# Patient Record
Sex: MALE | Race: WHITE | NOT HISPANIC OR LATINO | Employment: FULL TIME | URBAN - METROPOLITAN AREA
[De-identification: names, ages, dates, MRNs, and addresses within clinical notes are randomized per-mention and may not be internally consistent; named-entity substitution may affect disease eponyms.]

---

## 2017-02-24 ENCOUNTER — CONVERSION ENCOUNTER (OUTPATIENT)
Dept: FAMILY MEDICINE CLINIC | Facility: CLINIC | Age: 37
End: 2017-02-24

## 2019-02-01 ENCOUNTER — HOSPITAL ENCOUNTER (OUTPATIENT)
Dept: FAMILY MEDICINE CLINIC | Facility: CLINIC | Age: 39
Setting detail: SPECIMEN
Discharge: HOME | End: 2019-02-01
Attending: FAMILY MEDICINE | Admitting: FAMILY MEDICINE

## 2019-02-01 ENCOUNTER — CONVERSION ENCOUNTER (OUTPATIENT)
Dept: FAMILY MEDICINE CLINIC | Facility: CLINIC | Age: 39
End: 2019-02-01

## 2019-02-01 LAB
ALBUMIN SERPL-MCNC: 4.1 G/DL (ref 3.5–4.8)
ALBUMIN/GLOB SERPL: 1.6 {RATIO} (ref 1–1.7)
ALP SERPL-CCNC: 45 IU/L (ref 32–91)
ALT SERPL-CCNC: 36 IU/L (ref 17–63)
ANION GAP SERPL CALC-SCNC: 12.7 MMOL/L (ref 10–20)
AST SERPL-CCNC: 37 IU/L (ref 15–41)
BASOPHILS # BLD AUTO: 0.1 10*3/UL (ref 0–0.2)
BASOPHILS NFR BLD AUTO: 1 % (ref 0–2)
BILIRUB SERPL-MCNC: 0.6 MG/DL (ref 0.3–1.2)
BILIRUB UR QL STRIP: NEGATIVE MG/DL
BUN SERPL-MCNC: 14 MG/DL (ref 8–20)
BUN/CREAT SERPL: 14 (ref 6.2–20.3)
CALCIUM SERPL-MCNC: 9 MG/DL (ref 8.9–10.3)
CASTS URNS QL MICRO: ABNORMAL /[LPF]
CHLORIDE SERPL-SCNC: 100 MMOL/L (ref 101–111)
CHOLEST SERPL-MCNC: 174 MG/DL
CHOLEST/HDLC SERPL: 3.9 {RATIO}
COLOR UR: YELLOW
CONV BACTERIA IN URINE MICRO: NEGATIVE
CONV CLARITY OF URINE: CLEAR
CONV CO2: 26 MMOL/L (ref 22–32)
CONV HYALINE CASTS IN URINE MICRO: 0 /[LPF] (ref 0–5)
CONV LDL CHOLESTEROL DIRECT: 115 MG/DL (ref 0–100)
CONV PROTEIN IN URINE BY AUTOMATED TEST STRIP: NEGATIVE MG/DL
CONV SMALL ROUND CELLS: ABNORMAL /[HPF]
CONV TOTAL PROTEIN: 6.7 G/DL (ref 6.1–7.9)
CONV UROBILINOGEN IN URINE BY AUTOMATED TEST STRIP: 0.2 MG/DL
CREAT UR-MCNC: 1 MG/DL (ref 0.7–1.2)
CULTURE INDICATED?: ABNORMAL
DIFFERENTIAL METHOD BLD: (no result)
EOSINOPHIL # BLD AUTO: 0.3 10*3/UL (ref 0–0.3)
EOSINOPHIL # BLD AUTO: 5 % (ref 0–3)
ERYTHROCYTE [DISTWIDTH] IN BLOOD BY AUTOMATED COUNT: 13 % (ref 11.5–14.5)
GLOBULIN UR ELPH-MCNC: 2.6 G/DL (ref 2.5–3.8)
GLUCOSE SERPL-MCNC: 90 MG/DL (ref 65–99)
GLUCOSE UR QL: NEGATIVE MG/DL
HCT VFR BLD AUTO: 40.1 % (ref 40–54)
HDLC SERPL-MCNC: 45 MG/DL
HGB BLD-MCNC: 13.6 G/DL (ref 14–18)
HGB UR QL STRIP: NEGATIVE
KETONES UR QL STRIP: ABNORMAL MG/DL
LDLC/HDLC SERPL: 2.6 {RATIO}
LEUKOCYTE ESTERASE UR QL STRIP: NEGATIVE
LIPID INTERPRETATION: ABNORMAL
LYMPHOCYTES # BLD AUTO: 2 10*3/UL (ref 0.8–4.8)
LYMPHOCYTES NFR BLD AUTO: 33 % (ref 18–42)
MCH RBC QN AUTO: 30.4 PG (ref 26–32)
MCHC RBC AUTO-ENTMCNC: 33.9 G/DL (ref 32–36)
MCV RBC AUTO: 89.6 FL (ref 80–94)
MONOCYTES # BLD AUTO: 0.4 10*3/UL (ref 0.1–1.3)
MONOCYTES NFR BLD AUTO: 6 % (ref 2–11)
NEUTROPHILS # BLD AUTO: 3.3 10*3/UL (ref 2.3–8.6)
NEUTROPHILS NFR BLD AUTO: 55 % (ref 50–75)
NITRITE UR QL STRIP: NEGATIVE
NRBC BLD AUTO-RTO: 0 /100{WBCS}
NRBC/RBC NFR BLD MANUAL: 0 10*3/UL
PH UR STRIP.AUTO: 6 [PH] (ref 4.5–8)
PLATELET # BLD AUTO: 280 10*3/UL (ref 150–450)
PMV BLD AUTO: 8 FL (ref 7.4–10.4)
POTASSIUM SERPL-SCNC: 3.7 MMOL/L (ref 3.6–5.1)
RBC # BLD AUTO: 4.47 10*6/UL (ref 4.6–6)
RBC #/AREA URNS HPF: 0 /[HPF] (ref 0–3)
SODIUM SERPL-SCNC: 135 MMOL/L (ref 136–144)
SP GR UR: 1.02 (ref 1–1.03)
SPERM URNS QL MICRO: ABNORMAL /[HPF]
SQUAMOUS SPT QL MICRO: 0 /[HPF] (ref 0–5)
TRIGL SERPL-MCNC: 93 MG/DL
UNIDENT CRYS URNS QL MICRO: ABNORMAL /[HPF]
VLDLC SERPL CALC-MCNC: 13.8 MG/DL
WBC # BLD AUTO: 5.9 10*3/UL (ref 4.5–11.5)
WBC #/AREA URNS HPF: 0 /[HPF] (ref 0–5)
YEAST SPEC QL WET PREP: ABNORMAL /[HPF]

## 2019-02-06 ENCOUNTER — HOSPITAL ENCOUNTER (OUTPATIENT)
Dept: FAMILY MEDICINE CLINIC | Facility: CLINIC | Age: 39
Setting detail: SPECIMEN
Discharge: HOME | End: 2019-02-06
Attending: FAMILY MEDICINE | Admitting: FAMILY MEDICINE

## 2019-02-06 LAB
IRON SATN MFR SERPL: 23 % (ref 20–50)
IRON SERPL-MCNC: 81 UG/DL (ref 45–182)
TIBC SERPL-MCNC: 348 UG/DL (ref 228–428)

## 2019-03-21 ENCOUNTER — HOSPITAL ENCOUNTER (OUTPATIENT)
Dept: FAMILY MEDICINE CLINIC | Facility: CLINIC | Age: 39
Setting detail: SPECIMEN
Discharge: HOME | End: 2019-03-21
Attending: FAMILY MEDICINE | Admitting: FAMILY MEDICINE

## 2019-03-21 LAB
BASOPHILS # BLD AUTO: 0 10*3/UL (ref 0–0.2)
BASOPHILS NFR BLD AUTO: 1 % (ref 0–2)
DIFFERENTIAL METHOD BLD: (no result)
EOSINOPHIL # BLD AUTO: 0.3 10*3/UL (ref 0–0.3)
EOSINOPHIL # BLD AUTO: 6 % (ref 0–3)
ERYTHROCYTE [DISTWIDTH] IN BLOOD BY AUTOMATED COUNT: 13.2 % (ref 11.5–14.5)
HCT VFR BLD AUTO: 42.4 % (ref 40–54)
HGB BLD-MCNC: 14.1 G/DL (ref 14–18)
LYMPHOCYTES # BLD AUTO: 1.4 10*3/UL (ref 0.8–4.8)
LYMPHOCYTES NFR BLD AUTO: 34 % (ref 18–42)
MCH RBC QN AUTO: 29.8 PG (ref 26–32)
MCHC RBC AUTO-ENTMCNC: 33.2 G/DL (ref 32–36)
MCV RBC AUTO: 89.5 FL (ref 80–94)
MONOCYTES # BLD AUTO: 0.3 10*3/UL (ref 0.1–1.3)
MONOCYTES NFR BLD AUTO: 7 % (ref 2–11)
NEUTROPHILS # BLD AUTO: 2.2 10*3/UL (ref 2.3–8.6)
NEUTROPHILS NFR BLD AUTO: 52 % (ref 50–75)
NRBC BLD AUTO-RTO: 0 /100{WBCS}
NRBC/RBC NFR BLD MANUAL: 0 10*3/UL
PLATELET # BLD AUTO: 249 10*3/UL (ref 150–450)
PMV BLD AUTO: 8.2 FL (ref 7.4–10.4)
RBC # BLD AUTO: 4.73 10*6/UL (ref 4.6–6)
WBC # BLD AUTO: 4.2 10*3/UL (ref 4.5–11.5)

## 2019-05-20 ENCOUNTER — HOSPITAL ENCOUNTER (OUTPATIENT)
Dept: FAMILY MEDICINE CLINIC | Facility: CLINIC | Age: 39
Setting detail: SPECIMEN
Discharge: HOME | End: 2019-05-20
Attending: FAMILY MEDICINE | Admitting: FAMILY MEDICINE

## 2019-05-20 LAB
CHOLEST SERPL-MCNC: 129 MG/DL
CHOLEST/HDLC SERPL: 2.6 {RATIO}
CONV LDL CHOLESTEROL DIRECT: 81 MG/DL (ref 0–100)
HDLC SERPL-MCNC: 49 MG/DL
LDLC/HDLC SERPL: 1.6 {RATIO}
LIPID INTERPRETATION: NORMAL
TRIGL SERPL-MCNC: 53 MG/DL
VLDLC SERPL CALC-MCNC: 0 MG/DL

## 2019-06-03 VITALS
RESPIRATION RATE: 16 BRPM | WEIGHT: 239 LBS | SYSTOLIC BLOOD PRESSURE: 122 MMHG | WEIGHT: 237.2 LBS | SYSTOLIC BLOOD PRESSURE: 126 MMHG | HEART RATE: 75 BPM | DIASTOLIC BLOOD PRESSURE: 82 MMHG | RESPIRATION RATE: 12 BRPM | DIASTOLIC BLOOD PRESSURE: 79 MMHG | HEART RATE: 58 BPM

## 2019-07-09 ENCOUNTER — ANESTHESIA EVENT (OUTPATIENT)
Dept: PERIOP | Facility: HOSPITAL | Age: 39
End: 2019-07-09

## 2019-07-09 ENCOUNTER — ANESTHESIA (OUTPATIENT)
Dept: PERIOP | Facility: HOSPITAL | Age: 39
End: 2019-07-09

## 2019-07-09 ENCOUNTER — APPOINTMENT (OUTPATIENT)
Dept: CT IMAGING | Facility: HOSPITAL | Age: 39
End: 2019-07-09

## 2019-07-09 ENCOUNTER — HOSPITAL ENCOUNTER (EMERGENCY)
Facility: HOSPITAL | Age: 39
Discharge: HOME | End: 2019-07-09
Attending: EMERGENCY MEDICINE | Admitting: SURGERY

## 2019-07-09 VITALS
WEIGHT: 226.63 LBS | DIASTOLIC BLOOD PRESSURE: 88 MMHG | SYSTOLIC BLOOD PRESSURE: 143 MMHG | HEIGHT: 75 IN | TEMPERATURE: 98.1 F | RESPIRATION RATE: 16 BRPM | OXYGEN SATURATION: 94 % | BODY MASS INDEX: 28.18 KG/M2 | HEART RATE: 80 BPM

## 2019-07-09 DIAGNOSIS — R10.31 RIGHT LOWER QUADRANT ABDOMINAL PAIN: Primary | ICD-10-CM

## 2019-07-09 DIAGNOSIS — K35.80 ACUTE APPENDICITIS, UNSPECIFIED ACUTE APPENDICITIS TYPE: ICD-10-CM

## 2019-07-09 DIAGNOSIS — K35.80 ACUTE APPENDICITIS: ICD-10-CM

## 2019-07-09 LAB
ANION GAP SERPL CALCULATED.3IONS-SCNC: 15.5 MMOL/L (ref 5–15)
BASOPHILS # BLD AUTO: 0.1 10*3/MM3 (ref 0–0.2)
BASOPHILS NFR BLD AUTO: 1.1 % (ref 0–1.5)
BILIRUB UR QL STRIP: NEGATIVE
BUN BLD-MCNC: 14 MG/DL (ref 8–20)
BUN/CREAT SERPL: 17.5 (ref 6.2–20.3)
CALCIUM SPEC-SCNC: 9.2 MG/DL (ref 8.9–10.3)
CHLORIDE SERPL-SCNC: 102 MMOL/L (ref 101–111)
CLARITY UR: CLEAR
CO2 SERPL-SCNC: 22 MMOL/L (ref 22–32)
COLOR UR: YELLOW
CREAT BLD-MCNC: 0.8 MG/DL (ref 0.7–1.2)
DEPRECATED RDW RBC AUTO: 41.6 FL (ref 37–54)
EOSINOPHIL # BLD AUTO: 0 10*3/MM3 (ref 0–0.4)
EOSINOPHIL NFR BLD AUTO: 0.3 % (ref 0.3–6.2)
ERYTHROCYTE [DISTWIDTH] IN BLOOD BY AUTOMATED COUNT: 13.3 % (ref 12.3–15.4)
GFR SERPL CREATININE-BSD FRML MDRD: 108 ML/MIN/1.73
GLUCOSE BLD-MCNC: 120 MG/DL (ref 65–99)
GLUCOSE UR STRIP-MCNC: NEGATIVE MG/DL
HCT VFR BLD AUTO: 41.2 % (ref 37.5–51)
HGB BLD-MCNC: 14 G/DL (ref 13–17.7)
HGB UR QL STRIP.AUTO: NEGATIVE
KETONES UR QL STRIP: ABNORMAL
LEUKOCYTE ESTERASE UR QL STRIP.AUTO: NEGATIVE
LYMPHOCYTES # BLD AUTO: 1.3 10*3/MM3 (ref 0.7–3.1)
LYMPHOCYTES NFR BLD AUTO: 10.9 % (ref 19.6–45.3)
MCH RBC QN AUTO: 30.5 PG (ref 26.6–33)
MCHC RBC AUTO-ENTMCNC: 34 G/DL (ref 31.5–35.7)
MCV RBC AUTO: 89.9 FL (ref 79–97)
MONOCYTES # BLD AUTO: 0.7 10*3/MM3 (ref 0.1–0.9)
MONOCYTES NFR BLD AUTO: 5.5 % (ref 5–12)
NEUTROPHILS # BLD AUTO: 9.9 10*3/MM3 (ref 1.7–7)
NEUTROPHILS NFR BLD AUTO: 82.2 % (ref 42.7–76)
NITRITE UR QL STRIP: NEGATIVE
NRBC BLD AUTO-RTO: 0 /100 WBC (ref 0–0.2)
PH UR STRIP.AUTO: 6 [PH] (ref 5–8)
PLATELET # BLD AUTO: 262 10*3/MM3 (ref 140–450)
PMV BLD AUTO: 8.1 FL (ref 6–12)
POTASSIUM BLD-SCNC: 3.5 MMOL/L (ref 3.6–5.1)
PROT UR QL STRIP: NEGATIVE
RBC # BLD AUTO: 4.59 10*6/MM3 (ref 4.14–5.8)
SODIUM BLD-SCNC: 136 MMOL/L (ref 136–144)
SP GR UR STRIP: 1.01 (ref 1–1.03)
UROBILINOGEN UR QL STRIP: ABNORMAL
WBC NRBC COR # BLD: 12 10*3/MM3 (ref 3.4–10.8)

## 2019-07-09 PROCEDURE — 25010000002 FENTANYL CITRATE (PF) 100 MCG/2ML SOLUTION: Performed by: ANESTHESIOLOGY

## 2019-07-09 PROCEDURE — 81003 URINALYSIS AUTO W/O SCOPE: CPT | Performed by: EMERGENCY MEDICINE

## 2019-07-09 PROCEDURE — 25010000002 HYDROMORPHONE PER 4 MG: Performed by: ANESTHESIOLOGY

## 2019-07-09 PROCEDURE — 25010000002 PROPOFOL 10 MG/ML EMULSION: Performed by: ANESTHESIOLOGY

## 2019-07-09 PROCEDURE — 74176 CT ABD & PELVIS W/O CONTRAST: CPT

## 2019-07-09 PROCEDURE — 80048 BASIC METABOLIC PNL TOTAL CA: CPT | Performed by: EMERGENCY MEDICINE

## 2019-07-09 PROCEDURE — 25010000002 ONDANSETRON PER 1 MG: Performed by: ANESTHESIOLOGY

## 2019-07-09 PROCEDURE — 99284 EMERGENCY DEPT VISIT MOD MDM: CPT

## 2019-07-09 PROCEDURE — 88304 TISSUE EXAM BY PATHOLOGIST: CPT | Performed by: SURGERY

## 2019-07-09 PROCEDURE — 85025 COMPLETE CBC W/AUTO DIFF WBC: CPT | Performed by: EMERGENCY MEDICINE

## 2019-07-09 PROCEDURE — 25010000002 PIPERACILLIN SOD-TAZOBACTAM PER 1 G: Performed by: ANESTHESIOLOGY

## 2019-07-09 DEVICE — ENDOSCOPIC LINEAR CUTTER RELOADS BLUE 3.5MM
Type: IMPLANTABLE DEVICE | Site: ABDOMEN | Status: FUNCTIONAL
Brand: ECHELON; ENDOPATH

## 2019-07-09 RX ORDER — FENTANYL CITRATE 50 UG/ML
50 INJECTION, SOLUTION INTRAMUSCULAR; INTRAVENOUS
Status: DISCONTINUED | OUTPATIENT
Start: 2019-07-09 | End: 2019-07-09 | Stop reason: HOSPADM

## 2019-07-09 RX ORDER — GLYCOPYRROLATE 1 MG/5 ML
SYRINGE (ML) INTRAVENOUS AS NEEDED
Status: DISCONTINUED | OUTPATIENT
Start: 2019-07-09 | End: 2019-07-09 | Stop reason: SURG

## 2019-07-09 RX ORDER — METRONIDAZOLE 500 MG/1
500 TABLET ORAL 3 TIMES DAILY
Qty: 9 TABLET | Refills: 0 | Status: SHIPPED | OUTPATIENT
Start: 2019-07-09 | End: 2019-07-12

## 2019-07-09 RX ORDER — ALLOPURINOL 100 MG/1
300 TABLET ORAL DAILY
COMMUNITY
End: 2020-02-05

## 2019-07-09 RX ORDER — PROPOFOL 10 MG/ML
VIAL (ML) INTRAVENOUS AS NEEDED
Status: DISCONTINUED | OUTPATIENT
Start: 2019-07-09 | End: 2019-07-09 | Stop reason: SURG

## 2019-07-09 RX ORDER — ESOMEPRAZOLE MAGNESIUM 40 MG/1
40 CAPSULE, DELAYED RELEASE ORAL
COMMUNITY
End: 2019-08-15 | Stop reason: SDUPTHER

## 2019-07-09 RX ORDER — SODIUM CHLORIDE, SODIUM LACTATE, POTASSIUM CHLORIDE, CALCIUM CHLORIDE 600; 310; 30; 20 MG/100ML; MG/100ML; MG/100ML; MG/100ML
1000 INJECTION, SOLUTION INTRAVENOUS CONTINUOUS
Status: DISCONTINUED | OUTPATIENT
Start: 2019-07-09 | End: 2019-07-09 | Stop reason: HOSPADM

## 2019-07-09 RX ORDER — CEPHALEXIN 500 MG/1
500 CAPSULE ORAL 3 TIMES DAILY
Qty: 40 CAPSULE | Refills: 0 | Status: SHIPPED | OUTPATIENT
Start: 2019-07-09 | End: 2019-07-09 | Stop reason: SDUPTHER

## 2019-07-09 RX ORDER — DEXAMETHASONE SODIUM PHOSPHATE 4 MG/ML
INJECTION, SOLUTION INTRA-ARTICULAR; INTRALESIONAL; INTRAMUSCULAR; INTRAVENOUS; SOFT TISSUE AS NEEDED
Status: DISCONTINUED | OUTPATIENT
Start: 2019-07-09 | End: 2019-07-09

## 2019-07-09 RX ORDER — SODIUM CHLORIDE 0.9 % (FLUSH) 0.9 %
3 SYRINGE (ML) INJECTION EVERY 12 HOURS SCHEDULED
Status: DISCONTINUED | OUTPATIENT
Start: 2019-07-09 | End: 2019-07-09 | Stop reason: HOSPADM

## 2019-07-09 RX ORDER — NEOSTIGMINE METHYLSULFATE 5 MG/5 ML
SYRINGE (ML) INTRAVENOUS AS NEEDED
Status: DISCONTINUED | OUTPATIENT
Start: 2019-07-09 | End: 2019-07-09 | Stop reason: SURG

## 2019-07-09 RX ORDER — ONDANSETRON 2 MG/ML
4 INJECTION INTRAMUSCULAR; INTRAVENOUS ONCE AS NEEDED
Status: DISCONTINUED | OUTPATIENT
Start: 2019-07-09 | End: 2019-07-09 | Stop reason: HOSPADM

## 2019-07-09 RX ORDER — PIPERACILLIN SODIUM, TAZOBACTAM SODIUM 3; .375 G/15ML; G/15ML
INJECTION, POWDER, LYOPHILIZED, FOR SOLUTION INTRAVENOUS AS NEEDED
Status: DISCONTINUED | OUTPATIENT
Start: 2019-07-09 | End: 2019-07-09 | Stop reason: SURG

## 2019-07-09 RX ORDER — BUPIVACAINE HYDROCHLORIDE AND EPINEPHRINE 2.5; 5 MG/ML; UG/ML
INJECTION, SOLUTION EPIDURAL; INFILTRATION; INTRACAUDAL; PERINEURAL AS NEEDED
Status: DISCONTINUED | OUTPATIENT
Start: 2019-07-09 | End: 2019-07-09 | Stop reason: HOSPADM

## 2019-07-09 RX ORDER — FENTANYL CITRATE 50 UG/ML
INJECTION, SOLUTION INTRAMUSCULAR; INTRAVENOUS AS NEEDED
Status: DISCONTINUED | OUTPATIENT
Start: 2019-07-09 | End: 2019-07-09 | Stop reason: SURG

## 2019-07-09 RX ORDER — CEPHALEXIN 500 MG/1
500 CAPSULE ORAL 3 TIMES DAILY
Qty: 9 CAPSULE | Refills: 0 | Status: SHIPPED | OUTPATIENT
Start: 2019-07-09 | End: 2019-07-18

## 2019-07-09 RX ORDER — ROCURONIUM BROMIDE 10 MG/ML
INJECTION, SOLUTION INTRAVENOUS AS NEEDED
Status: DISCONTINUED | OUTPATIENT
Start: 2019-07-09 | End: 2019-07-09 | Stop reason: SURG

## 2019-07-09 RX ORDER — ONDANSETRON 2 MG/ML
INJECTION INTRAMUSCULAR; INTRAVENOUS AS NEEDED
Status: DISCONTINUED | OUTPATIENT
Start: 2019-07-09 | End: 2019-07-09 | Stop reason: SURG

## 2019-07-09 RX ORDER — SODIUM CHLORIDE 0.9 % (FLUSH) 0.9 %
3-10 SYRINGE (ML) INJECTION AS NEEDED
Status: DISCONTINUED | OUTPATIENT
Start: 2019-07-09 | End: 2019-07-09 | Stop reason: HOSPADM

## 2019-07-09 RX ORDER — OXYCODONE HYDROCHLORIDE AND ACETAMINOPHEN 5; 325 MG/1; MG/1
1 TABLET ORAL EVERY 6 HOURS PRN
Qty: 30 TABLET | Refills: 0 | Status: SHIPPED | OUTPATIENT
Start: 2019-07-09 | End: 2020-02-05

## 2019-07-09 RX ORDER — SODIUM CHLORIDE, SODIUM LACTATE, POTASSIUM CHLORIDE, CALCIUM CHLORIDE 600; 310; 30; 20 MG/100ML; MG/100ML; MG/100ML; MG/100ML
9 INJECTION, SOLUTION INTRAVENOUS CONTINUOUS PRN
Status: DISCONTINUED | OUTPATIENT
Start: 2019-07-09 | End: 2019-07-09 | Stop reason: HOSPADM

## 2019-07-09 RX ORDER — SODIUM CHLORIDE 0.9 % (FLUSH) 0.9 %
10 SYRINGE (ML) INJECTION AS NEEDED
Status: DISCONTINUED | OUTPATIENT
Start: 2019-07-09 | End: 2019-07-09 | Stop reason: HOSPADM

## 2019-07-09 RX ORDER — HYDROMORPHONE HCL 110MG/55ML
PATIENT CONTROLLED ANALGESIA SYRINGE INTRAVENOUS AS NEEDED
Status: DISCONTINUED | OUTPATIENT
Start: 2019-07-09 | End: 2019-07-09 | Stop reason: SURG

## 2019-07-09 RX ORDER — HYDROMORPHONE HCL 110MG/55ML
0.5 PATIENT CONTROLLED ANALGESIA SYRINGE INTRAVENOUS
Status: DISCONTINUED | OUTPATIENT
Start: 2019-07-09 | End: 2019-07-09 | Stop reason: HOSPADM

## 2019-07-09 RX ADMIN — FENTANYL CITRATE 100 MCG: 50 INJECTION, SOLUTION INTRAMUSCULAR; INTRAVENOUS at 17:21

## 2019-07-09 RX ADMIN — Medication 4 MG: at 17:53

## 2019-07-09 RX ADMIN — Medication 0.5 MG: at 17:53

## 2019-07-09 RX ADMIN — HYDROMORPHONE HYDROCHLORIDE 2 MG: 2 INJECTION INTRAMUSCULAR; INTRAVENOUS; SUBCUTANEOUS at 17:55

## 2019-07-09 RX ADMIN — PROPOFOL 200 MG: 10 INJECTION, EMULSION INTRAVENOUS at 17:21

## 2019-07-09 RX ADMIN — PIPERACILLIN SODIUM,TAZOBACTAM SODIUM 3.38 G: 3; .375 INJECTION, POWDER, FOR SOLUTION INTRAVENOUS at 17:45

## 2019-07-09 RX ADMIN — FENTANYL CITRATE 150 MCG: 50 INJECTION, SOLUTION INTRAMUSCULAR; INTRAVENOUS at 17:25

## 2019-07-09 RX ADMIN — ONDANSETRON 4 MG: 2 INJECTION INTRAMUSCULAR; INTRAVENOUS at 17:43

## 2019-07-09 RX ADMIN — SODIUM CHLORIDE, SODIUM LACTATE, POTASSIUM CHLORIDE, AND CALCIUM CHLORIDE 1000 ML: 600; 310; 30; 20 INJECTION, SOLUTION INTRAVENOUS at 16:28

## 2019-07-09 RX ADMIN — ROCURONIUM BROMIDE 50 MG: 10 INJECTION, SOLUTION INTRAVENOUS at 17:21

## 2019-07-09 NOTE — ED NOTES
Report called to pre-op spoke with Lisa GAMEZ. Pt to be taken to OR potentially within the hour.      Esther Wing, RN  07/09/19 6340

## 2019-07-09 NOTE — H&P
Patient Care Team:  Keo Morris MD as PCP - General (Family Medicine)    Chief complaint right lower quadrant abdominal pain    Subjective     Patient is a pleasant 39-year-old gentleman.  He began with right lower quadrant abdominal pain last night about 1130.  It persisted through the evening and about 3:30 in the morning it became quite severe.  As the pain progressed it was associated with some sweating chills, nausea and one episode of vomiting.  It got so severe he presented to the emergency department where CT scan confirmed acute appendicitis and surgical consultation was obtained.    History  History reviewed. No pertinent past medical history.  History reviewed. No pertinent surgical history.  History reviewed. No pertinent family history.  Social History     Tobacco Use   • Smoking status: Never Smoker   Substance Use Topics   • Alcohol use: Not on file   • Drug use: No     Medications Prior to Admission   Medication Sig Dispense Refill Last Dose   • allopurinol (ZYLOPRIM) 100 MG tablet Take 300 mg by mouth Daily.      • esomeprazole (nexIUM) 40 MG capsule Take 40 mg by mouth Every Morning Before Breakfast.        Allergies:  Patient has no known allergies.    Review of Systems   Constitutional: Negative for activity change and chills.   HENT: Negative for sore throat and voice change.    Eyes: Negative for blurred vision.   Respiratory: Negative for chest tightness.    Cardiovascular: Negative for chest pain.   Gastrointestinal: Positive for abdominal pain.        Right lower quadrant abdominal pain   Endocrine: Negative for cold intolerance.   Genitourinary: Negative for urgency.   Musculoskeletal: Negative for arthralgias.   Skin: Negative for color change.   Neurological: Negative for syncope and confusion.   Hematological: Does not bruise/bleed easily.   Psychiatric/Behavioral: Negative for hallucinations.        Objective     Vital Signs  Temp:  [97.9 °F (36.6 °C)-98.5 °F (36.9 °C)] 98.5  °F (36.9 °C)  Heart Rate:  [71-80] 71  Resp:  [16-17] 17  BP: (133-152)/(59-82) 145/82    Physical Exam   Constitutional: He is oriented to person, place, and time. He appears well-developed and well-nourished.   HENT:   Head: Normocephalic and atraumatic.   Eyes: EOM are normal.   Neck: Normal range of motion.   Cardiovascular: Normal rate.   Pulmonary/Chest: Effort normal.   Abdominal: Soft.   Tender to palpation at McBurney's point with mild voluntary guarding.   Genitourinary:   Genitourinary Comments: Deferred   Musculoskeletal: Normal range of motion.   Neurological: He is alert and oriented to person, place, and time.   Skin: Skin is warm and dry.   Psychiatric: He has a normal mood and affect.       Results Review:  Lab Results (last 24 hours)     Procedure Component Value Units Date/Time    Basic Metabolic Panel [[id number]]  (Abnormal) Collected:  07/09/19 1251    Specimen:  Blood Updated:  07/09/19 1323     Glucose 120 mg/dL      BUN 14 mg/dL      Creatinine 0.80 mg/dL      Sodium 136 mmol/L      Potassium 3.5 mmol/L      Chloride 102 mmol/L      CO2 22.0 mmol/L      Calcium 9.2 mg/dL      eGFR Non African Amer 108 mL/min/1.73      BUN/Creatinine Ratio 17.5     Anion Gap 15.5 mmol/L     Urinalysis With Microscopic If Indicated (No Culture) - Urine, Clean Catch [[id number]]  (Abnormal) Collected:  07/09/19 1306    Specimen:  Urine, Clean Catch Updated:  07/09/19 1314     Color, UA Yellow     Appearance, UA Clear     pH, UA 6.0     Specific Gravity, UA 1.010     Glucose, UA Negative     Ketones, UA Trace     Bilirubin, UA Negative     Blood, UA Negative     Protein, UA Negative     Leuk Esterase, UA Negative     Nitrite, UA Negative     Urobilinogen, UA 0.2 E.U./dL    Narrative:       Urine microscopic not indicated.    CBC & Differential [[id number]] Collected:  07/09/19 1251    Specimen:  Blood Updated:  07/09/19 1307    Narrative:       The following orders were created for panel order CBC &  Differential.  Procedure                               Abnormality         Status                     ---------                               -----------         ------                     CBC Auto Differential[[id number]]        Abnormal            Final result                 Please view results for these tests on the individual orders.    CBC Auto Differential [[id number]]  (Abnormal) Collected:  07/09/19 1251    Specimen:  Blood Updated:  07/09/19 1307     WBC 12.00 10*3/mm3      RBC 4.59 10*6/mm3      Hemoglobin 14.0 g/dL      Hematocrit 41.2 %      MCV 89.9 fL      MCH 30.5 pg      MCHC 34.0 g/dL      RDW 13.3 %      RDW-SD 41.6 fl      MPV 8.1 fL      Platelets 262 10*3/mm3      Neutrophil % 82.2 %      Lymphocyte % 10.9 %      Monocyte % 5.5 %      Eosinophil % 0.3 %      Basophil % 1.1 %      Neutrophils, Absolute 9.90 10*3/mm3      Lymphocytes, Absolute 1.30 10*3/mm3      Monocytes, Absolute 0.70 10*3/mm3      Eosinophils, Absolute 0.00 10*3/mm3      Basophils, Absolute 0.10 10*3/mm3      nRBC 0.0 /100 WBC         Imaging Results (last 24 hours)     Procedure Component Value Units Date/Time    CT Abdomen Pelvis Without Contrast [[id number]] Collected:  07/09/19 1333     Updated:  07/09/19 1337    Narrative:          DATE OF EXAM:  7/9/2019 1:22 PM     PROCEDURE:  CT ABDOMEN PELVIS WO CONTRAST-     INDICATIONS:  Right lower quadrant abdominal pain with vomiting and distention for 2  days.     COMPARISON:  No Comparisons Available     TECHNIQUE:  Routine transaxial slices were obtained through the abdomen and pelvis  without the administration of intravenous contrast. Reconstructed  coronal and sagittal images were also obtained. Automated exposure  control and iterative construction methods were used.      FINDINGS:  Abnormal thickening and inflammatory change of the appendix consistent  with acute appendicitis. No periappendiceal abscess or free fluid is  identified. No appendicolith is seen.     Noncontrast  appearance of the liver, gallbladder, spleen, pancreas,  adrenals and left kidney is within normal limits     There are 2 nonobstructing right renal stones, dominant measuring 2 to 3  mm. No ureteral stone or hydronephrosis.     Urinary bladder, prostate and rectum are normal.     Degenerative disc and endplate changes at L5-S1. No acute osseous  abnormality.        Impression:          1. FINDINGS consistent with acute appendicitis without evidence of  appendiceal perforation or periappendiceal abscess.  2. Nodularity right renal stones.     Electronically Signed By-Dr. Shavonne Lakhani MD On:7/9/2019 1:35 PM  This report was finalized on [id number] by Dr. Shavonne Lakhani MD.          I reviewed the patient's other test results and agree with the interpretation  I reviewed the patient's new imaging results and agree with the interpretation.    Assessment/Plan     Active Problems:    * No active hospital problems. *      Acute appendicitis- plan for laparoscopic appendectomy I discussed the possibility of conversion to open with the patient and family at the bedside.  And also discussed that he has not been symptom medic for very long and if is completely uneventful may consider discharged home later today.    I discussed the patients findings and my recommendations with patient.     Shannon Kwok MD  07/09/19  5:06 PM

## 2019-07-09 NOTE — ANESTHESIA PREPROCEDURE EVALUATION
Anesthesia Evaluation     Patient summary reviewed and Nursing notes reviewed   NPO Solid Status: > 6 hours  NPO Liquid Status: > 6 hours           Airway   Mallampati: II  TM distance: >3 FB  Neck ROM: full  No difficulty expected  Dental - normal exam     Pulmonary - negative pulmonary ROS and normal exam    breath sounds clear to auscultation  Cardiovascular - negative cardio ROS and normal exam    ECG reviewed  Rhythm: regular  Rate: normal        Neuro/Psych- negative ROS  GI/Hepatic/Renal/Endo - negative ROS     Musculoskeletal (-) negative ROS    Abdominal  - normal exam    Abdomen: soft.  Bowel sounds: normal.   Substance History - negative use     OB/GYN negative ob/gyn ROS         Other - negative ROS                       Anesthesia Plan    ASA 2 - emergent     general     Anesthetic plan, all risks, benefits, and alternatives have been provided, discussed and informed consent has been obtained with: patient.

## 2019-07-09 NOTE — ED PROVIDER NOTES
Subjective   Patient is a 39-year-old male complaining of 24-hour history of right-sided lower abdominal pain with vomiting.  States pain was moderate to severe and constant.  Denies cough fever diarrhea dysuria or other associated complaints            Review of Systems  Negative for headache years of cough fever chest pain shortness of breath diarrhea dysuria or other associated complaints  History reviewed. No pertinent past medical history.    No Known Allergies    History reviewed. No pertinent surgical history.    History reviewed. No pertinent family history.    Social History     Socioeconomic History   • Marital status:      Spouse name: Not on file   • Number of children: Not on file   • Years of education: Not on file   • Highest education level: Not on file   Tobacco Use   • Smoking status: Never Smoker   Substance and Sexual Activity   • Drug use: No           Objective   Physical Exam  HEENT exam shows TMs to be clear.  Oropharynx, spit sclerae nonicteric.  Neck has no adenopathy JVD or bruits.  Lungs are clear.  Heart is regular rate rhythm without murmur gallop.  Chest is nontender.  Abdomen soft with mild to moderate right lower quadrant tenderness.  Patient has normal bowel sounds with no renogram.  Back has no CVA tenderness.  Procedures           ED Course                  MDM  Number of Diagnoses or Management Options  Diagnosis management comments: Patient findings consistent with acute appendicitis on CT scan.  Letter data is seen for mild leukocytosis.  There is no metabolic abnormalities.  Patient will be admitted to the operating room for surgical.  The on-call surgeon was notified.    Risk of Complications, Morbidity, and/or Mortality  Presenting problems: high  Diagnostic procedures: high  Management options: high    Patient Progress  Patient progress: stable        Final diagnoses:   Right lower quadrant abdominal pain   Acute appendicitis, unspecified acute appendicitis type             Narendra Youssef MD  07/09/19 2751

## 2019-07-09 NOTE — ANESTHESIA POSTPROCEDURE EVALUATION
Patient: Talon Manning    Procedure Summary     Date:  07/09/19 Room / Location:  [facility] OR 08 / [facility] MAIN OR    Anesthesia Start:  1718 Anesthesia Stop:  1806    Procedure:  APPENDECTOMY LAPAROSCOPIC (N/A Abdomen) Diagnosis:      Surgeon:  Shannon Kwok MD Provider:  Robert Partida MD    Anesthesia Type:  general ASA Status:  2 - Emergent          Anesthesia Type: general  Last vitals  BP   145/82 (07/09/19 1623)   Temp   98.5 °F (36.9 °C) (07/09/19 1623)   Pulse   71 (07/09/19 1623)   Resp   17 (07/09/19 1623)     SpO2   98 % (07/09/19 1623)     Post Anesthesia Care and Evaluation    Patient location during evaluation: PACU  Patient participation: complete - patient participated  Level of consciousness: awake  Pain scale: See nurse's notes for pain score.  Pain management: adequate  Airway patency: patent  Anesthetic complications: No anesthetic complications  PONV Status: none  Cardiovascular status: acceptable  Respiratory status: acceptable  Hydration status: acceptable    Comments: Patient seen and examined postoperatively; vital signs stable; SpO2 greater than or equal to 90%; cardiopulmonary status stable; nausea/vomiting adequately controlled; pain adequately controlled; no apparent anesthesia complications; patient discharged from anesthesia care when discharge criteria were met

## 2019-07-09 NOTE — OP NOTE
Operative Note:    Patient Name:  Talon Manning  YOB: 1980    Date of Surgery:  7/9/2019     Indications: It is a pleasant 39-year-old gentleman with history physical exam and x-ray findings consistent with acute appendicitis.    Pre-op Diagnosis:   Acute appendicitis       Post-Op Diagnosis Codes:  Acute appendicitis    Procedure/CPT® Codes:      Procedure(s):  APPENDECTOMY LAPAROSCOPIC    Staff:  Surgeon(s):  Shannon Kwok MD    Assistant: Taniya Husain PA    Anesthesia: General    Estimated Blood Loss: minimal    Implants:    Nothing was implanted during the procedure    Specimen:          Specimens     ID Source Type Tests Collected By Collected At Frozen?      A Large Intestine, Appendix Tissue · TISSUE PATHOLOGY EXAM   Shannon Kwok MD 7/9/19 4566 No     Description: Appendix              Findings: Acute nonperforated appendicitis    Complications: None    Description of Procedure: Patient was brought to the operating room and transferred to the operating table in the standard supine position.  The region of the abdomen was sterilely prepped and draped, a Thomason catheter was placed, and a timeout was performed.  2 cm supraumbilical incision was made with a #15 blade and carried out probably to the level of fascia.  Fascia was grasped with 2 Kocher clamps incised and a blunt finger sweep was performed.  2-0 Vicryl stay sutures were placed the Jacobson cannula was introduced and carbon dioxide insufflation was begun.  Under direct visualization a 5 mm right-sided port and a 12 mm suprapubic port were placed.  The appendix was visualized and found to be grossly inflamed.  I mobilized the appendix using the harmonic scalpel.  I fired across the base of the appendix with an [place] 45 mm powered stapler.  The appendix was removed via an Endo Catch bag from the umbilical port site.  Irrigation was performed.  Hemostasis was verified.  And all ports were removed.  The umbilical port site was  closed with 0 Vicryl in a figure-of-eight.  And was closed with running 4-0 Vicryl.    Patient tolerated the procedure well.    Sponge and instrument counts correct x2.      Shannon Kwok MD     Date: 7/9/2019  Time: 6:05 PM

## 2019-07-09 NOTE — ANESTHESIA PROCEDURE NOTES
Airway  Urgency: emergent    Date/Time: 7/9/2019 5:23 PM  Airway not difficult    General Information and Staff    Patient location during procedure: OR    Consent for Airway (if performed for an anesthetic, see related documentation for consents)  Patient identity confirmed: verbally with patient and arm band  Consent: No emergent situation. Verbal consent not obtained. Written consent obtained.  Risks and benefits: risks, benefits and alternatives were discussed  Consent given by: patient      Indications and Patient Condition  Indications for airway management: airway protection    Preoxygenated: yes  Mask difficulty assessment: 1 - vent by mask    Final Airway Details  Final airway type: endotracheal airway      Successful airway: ETT  Cuffed: yes   Successful intubation technique: direct laryngoscopy  Facilitating devices/methods: cricoid pressure  Blade: Calvin  Blade size: 3  ETT size (mm): 7.0  Cormack-Lehane Classification: grade I - full view of glottis  Placement verified by: capnometry   Cuff volume (mL): 7  Measured from: lips  ETT to lips (cm): 24  Number of attempts at approach: 1

## 2019-07-11 LAB
LAB AP CASE REPORT: NORMAL
PATH REPORT.FINAL DX SPEC: NORMAL
PATH REPORT.GROSS SPEC: NORMAL

## 2019-07-16 NOTE — OP NOTE
Operative Note:    Patient Name:  Talon Manning  YOB: 1980    Date of Surgery:  7/9/2019     Indications: Patient is a pleasant 39-year-old gentleman with history physical exam and x-ray findings consistent with acute appendicitis.    Pre-op Diagnosis:   Acute appendicitis       Post-Op Diagnosis Codes:  Same    Procedure/CPT® Codes:      Procedure(s):  APPENDECTOMY LAPAROSCOPIC    Staff:  Surgeon(s):  Shannon Kwok MD    Assistant: Taniya Husain PA    Anesthesia: General    Estimated Blood Loss: minimal    Implants:    Implant Name Type Inv. Item Serial No.  Lot No. LRB No. Used   RELOAD STPLR ECHELON 45 FAISAL [id number] - [id number] Implant RELOAD STPLR ECHELON 45 FAISAL [id number]  [employer]  [employer]  N/A 1       Specimen:          Specimens     ID Source Type Tests Collected By Collected At Frozen?      A Large Intestine, Appendix Tissue · TISSUE PATHOLOGY EXAM   Shannon Kwok MD 7/9/19 3545 No     Description: Appendix              Findings: Acute appendicitis    Complications: None    Description of Procedure: Patient was brought to the operating room and transferred to the operating table in the standard supine position.  The region of the abdomen was sterilely prepped and draped, a Thomason catheter was placed, and a timeout was performed.  2 cm supraumbilical incision was made with a #15 blade and carried out probably to the level of fascia.  Fascia was grasped with 2 Kocher clamps incised and a blunt finger sweep was performed.  2-0 Vicryl stay sutures were placed the Jacobson cannula was introduced and carbon dioxide insufflation was begun.  Under direct visualization a 5 mm right-sided port and a 12 mm suprapubic port were placed.  The appendix was visualized and found to be grossly inflamed.  I mobilized the appendix using the harmonic scalpel.  I fired across the base of the appendix with an [place] 45 mm powered stapler.  The appendix was removed via an Endo  Catch bag from the umbilical port site.  Irrigation was performed.  Hemostasis was verified.  And all ports were removed.  The umbilical port site was closed with 0 Vicryl in a figure-of-eight.  And was closed with running 4-0 Vicryl.    Patient tolerated the procedure well.    Sponge and instrument counts correct x2.      Shannon Kwok MD     Date: 7/16/2019  Time: 7:36 PM

## 2019-08-12 ENCOUNTER — OFFICE (AMBULATORY)
Dept: URBAN - METROPOLITAN AREA CLINIC 64 | Facility: CLINIC | Age: 39
End: 2019-08-12
Payer: COMMERCIAL

## 2019-08-12 VITALS
SYSTOLIC BLOOD PRESSURE: 137 MMHG | HEIGHT: 75 IN | HEART RATE: 79 BPM | DIASTOLIC BLOOD PRESSURE: 72 MMHG | WEIGHT: 222 LBS

## 2019-08-12 DIAGNOSIS — K64.1 SECOND DEGREE HEMORRHOIDS: ICD-10-CM

## 2019-08-12 DIAGNOSIS — K62.5 HEMORRHAGE OF ANUS AND RECTUM: ICD-10-CM

## 2019-08-12 DIAGNOSIS — K59.00 CONSTIPATION, UNSPECIFIED: ICD-10-CM

## 2019-08-12 DIAGNOSIS — K21.9 GASTRO-ESOPHAGEAL REFLUX DISEASE WITHOUT ESOPHAGITIS: ICD-10-CM

## 2019-08-12 PROCEDURE — 46600 DIAGNOSTIC ANOSCOPY SPX: CPT | Performed by: INTERNAL MEDICINE

## 2019-08-12 PROCEDURE — 99203 OFFICE O/P NEW LOW 30 MIN: CPT | Mod: 25 | Performed by: INTERNAL MEDICINE

## 2019-08-15 RX ORDER — ESOMEPRAZOLE MAGNESIUM 40 MG/1
CAPSULE, DELAYED RELEASE ORAL
Qty: 90 CAPSULE | Refills: 1 | Status: SHIPPED | OUTPATIENT
Start: 2019-08-15 | End: 2020-02-06

## 2019-08-20 ENCOUNTER — ON CAMPUS - OUTPATIENT (AMBULATORY)
Dept: URBAN - METROPOLITAN AREA HOSPITAL 2 | Facility: HOSPITAL | Age: 39
End: 2019-08-20
Payer: COMMERCIAL

## 2019-08-20 ENCOUNTER — OFFICE (AMBULATORY)
Dept: URBAN - METROPOLITAN AREA PATHOLOGY 4 | Facility: PATHOLOGY | Age: 39
End: 2019-08-20
Payer: COMMERCIAL

## 2019-08-20 VITALS
HEART RATE: 69 BPM | HEART RATE: 70 BPM | DIASTOLIC BLOOD PRESSURE: 65 MMHG | OXYGEN SATURATION: 99 % | SYSTOLIC BLOOD PRESSURE: 110 MMHG | SYSTOLIC BLOOD PRESSURE: 109 MMHG | WEIGHT: 218.4 LBS | RESPIRATION RATE: 16 BRPM | DIASTOLIC BLOOD PRESSURE: 69 MMHG | DIASTOLIC BLOOD PRESSURE: 64 MMHG | HEART RATE: 80 BPM | HEART RATE: 71 BPM | DIASTOLIC BLOOD PRESSURE: 72 MMHG | HEART RATE: 76 BPM | SYSTOLIC BLOOD PRESSURE: 96 MMHG | DIASTOLIC BLOOD PRESSURE: 71 MMHG | SYSTOLIC BLOOD PRESSURE: 126 MMHG | DIASTOLIC BLOOD PRESSURE: 74 MMHG | RESPIRATION RATE: 17 BRPM | HEIGHT: 75 IN | SYSTOLIC BLOOD PRESSURE: 125 MMHG | TEMPERATURE: 97.8 F | DIASTOLIC BLOOD PRESSURE: 75 MMHG | OXYGEN SATURATION: 98 % | HEART RATE: 68 BPM | SYSTOLIC BLOOD PRESSURE: 138 MMHG | SYSTOLIC BLOOD PRESSURE: 102 MMHG | HEART RATE: 75 BPM | SYSTOLIC BLOOD PRESSURE: 112 MMHG | HEART RATE: 82 BPM | RESPIRATION RATE: 18 BRPM | SYSTOLIC BLOOD PRESSURE: 117 MMHG | HEART RATE: 78 BPM | OXYGEN SATURATION: 100 %

## 2019-08-20 DIAGNOSIS — K62.5 HEMORRHAGE OF ANUS AND RECTUM: ICD-10-CM

## 2019-08-20 DIAGNOSIS — K59.00 CONSTIPATION, UNSPECIFIED: ICD-10-CM

## 2019-08-20 DIAGNOSIS — D12.3 BENIGN NEOPLASM OF TRANSVERSE COLON: ICD-10-CM

## 2019-08-20 DIAGNOSIS — D12.2 BENIGN NEOPLASM OF ASCENDING COLON: ICD-10-CM

## 2019-08-20 DIAGNOSIS — K64.0 FIRST DEGREE HEMORRHOIDS: ICD-10-CM

## 2019-08-20 LAB
GI HISTOLOGY: A. UNSPECIFIED: (no result)
GI HISTOLOGY: B. UNSPECIFIED: (no result)
GI HISTOLOGY: PDF REPORT: (no result)

## 2019-08-20 PROCEDURE — 88305 TISSUE EXAM BY PATHOLOGIST: CPT | Performed by: INTERNAL MEDICINE

## 2019-08-20 PROCEDURE — 45385 COLONOSCOPY W/LESION REMOVAL: CPT | Performed by: INTERNAL MEDICINE

## 2019-08-20 PROCEDURE — 45380 COLONOSCOPY AND BIOPSY: CPT | Mod: 59 | Performed by: INTERNAL MEDICINE

## 2019-08-20 NOTE — SERVICEHPINOTES
PAMELA FORREST  is a  39  male   who presents today for a  Colonoscopy   for   the indications listed below. The updated Patient Profile was reviewed prior to the procedure, in conjunction with the Physical Exam, including medical conditions, surgical procedures, medications, allergies, family history and social history. See Physical Exam time stamp below for date and time of HPI completion.Pre-operatively, I reviewed the indication(s) for the procedure, the risks of the procedure [including but not limited to: unexpected bleeding possibly requiring hospitalization and/or unplanned repeat procedures, perforation possibly requiring surgical treatment, missed lesions and complications of sedation/MAC (also explained by anesthesia staff)]. I have evaluated the patient for risks associated with the planned anesthesia and the procedure to be performed and find the patient an acceptable candidate for IV sedation.Multiple opportunities were provided for any questions or concerns, and all questions were answered satisfactorily before any anesthesia was administered. We will proceed with the planned procedure.BR

## 2019-09-13 ENCOUNTER — TELEPHONE (OUTPATIENT)
Dept: FAMILY MEDICINE CLINIC | Facility: CLINIC | Age: 39
End: 2019-09-13

## 2019-09-13 NOTE — TELEPHONE ENCOUNTER
Wife left a voice message stating that she had a note in her phone that you wanted patient to have labwork done about now to check for anemia.  Maybe a CBC.  Patient has a really hard time getting in here.  Please fax order to wife at fax [phone] and patient will get the labs done and send the results to you.

## 2019-09-17 ENCOUNTER — TRANSCRIBE ORDERS (OUTPATIENT)
Dept: LAB | Facility: HOSPITAL | Age: 39
End: 2019-09-17

## 2019-09-17 ENCOUNTER — LAB (OUTPATIENT)
Dept: LAB | Facility: HOSPITAL | Age: 39
End: 2019-09-17

## 2019-09-17 DIAGNOSIS — D72.829 LEUKOCYTOSIS, UNSPECIFIED TYPE: ICD-10-CM

## 2019-09-17 DIAGNOSIS — D72.829 LEUKOCYTOSIS, UNSPECIFIED TYPE: Primary | ICD-10-CM

## 2019-09-17 LAB
BASOPHILS # BLD AUTO: 0 10*3/MM3 (ref 0–0.2)
BASOPHILS NFR BLD AUTO: 0.4 % (ref 0–1.5)
DEPRECATED RDW RBC AUTO: 41.6 FL (ref 37–54)
EOSINOPHIL # BLD AUTO: 0.1 10*3/MM3 (ref 0–0.4)
EOSINOPHIL NFR BLD AUTO: 1.9 % (ref 0.3–6.2)
ERYTHROCYTE [DISTWIDTH] IN BLOOD BY AUTOMATED COUNT: 13.1 % (ref 12.3–15.4)
HCT VFR BLD AUTO: 39.4 % (ref 37.5–51)
HGB BLD-MCNC: 13.3 G/DL (ref 13–17.7)
LYMPHOCYTES # BLD AUTO: 2.1 10*3/MM3 (ref 0.7–3.1)
LYMPHOCYTES NFR BLD AUTO: 33.1 % (ref 19.6–45.3)
MCH RBC QN AUTO: 30.1 PG (ref 26.6–33)
MCHC RBC AUTO-ENTMCNC: 33.7 G/DL (ref 31.5–35.7)
MCV RBC AUTO: 89.5 FL (ref 79–97)
MONOCYTES # BLD AUTO: 0.4 10*3/MM3 (ref 0.1–0.9)
MONOCYTES NFR BLD AUTO: 6.6 % (ref 5–12)
NEUTROPHILS # BLD AUTO: 3.7 10*3/MM3 (ref 1.7–7)
NEUTROPHILS NFR BLD AUTO: 58 % (ref 42.7–76)
NRBC BLD AUTO-RTO: 0 /100 WBC (ref 0–0.2)
PLATELET # BLD AUTO: 275 10*3/MM3 (ref 140–450)
PMV BLD AUTO: 8.7 FL (ref 6–12)
RBC # BLD AUTO: 4.4 10*6/MM3 (ref 4.14–5.8)
WBC NRBC COR # BLD: 6.3 10*3/MM3 (ref 3.4–10.8)

## 2019-09-17 PROCEDURE — 36415 COLL VENOUS BLD VENIPUNCTURE: CPT

## 2019-09-17 PROCEDURE — 85025 COMPLETE CBC W/AUTO DIFF WBC: CPT

## 2019-11-18 RX ORDER — ALLOPURINOL 300 MG/1
TABLET ORAL
Qty: 90 TABLET | Refills: 4 | Status: SHIPPED | OUTPATIENT
Start: 2019-11-18 | End: 2021-02-21 | Stop reason: SDUPTHER

## 2019-12-12 DIAGNOSIS — F51.01 PRIMARY INSOMNIA: Primary | ICD-10-CM

## 2019-12-12 RX ORDER — ZOLPIDEM TARTRATE 10 MG/1
TABLET ORAL
COMMUNITY
Start: 2014-02-04 | End: 2019-12-12 | Stop reason: SDUPTHER

## 2019-12-12 RX ORDER — ZOLPIDEM TARTRATE 10 MG/1
10 TABLET ORAL NIGHTLY PRN
Qty: 90 TABLET | Refills: 1 | Status: SHIPPED | OUTPATIENT
Start: 2019-12-12 | End: 2020-05-21 | Stop reason: SDUPTHER

## 2020-02-05 ENCOUNTER — OFFICE VISIT (OUTPATIENT)
Dept: FAMILY MEDICINE CLINIC | Facility: CLINIC | Age: 40
End: 2020-02-05

## 2020-02-05 VITALS
WEIGHT: 243 LBS | HEART RATE: 73 BPM | SYSTOLIC BLOOD PRESSURE: 124 MMHG | HEIGHT: 75 IN | DIASTOLIC BLOOD PRESSURE: 76 MMHG | RESPIRATION RATE: 20 BRPM | TEMPERATURE: 97.9 F | BODY MASS INDEX: 30.21 KG/M2 | OXYGEN SATURATION: 99 %

## 2020-02-05 DIAGNOSIS — Z23 IMMUNIZATION DUE: ICD-10-CM

## 2020-02-05 DIAGNOSIS — Z23 NEED FOR VACCINATION: Primary | ICD-10-CM

## 2020-02-05 PROCEDURE — 90471 IMMUNIZATION ADMIN: CPT | Performed by: FAMILY MEDICINE

## 2020-02-05 PROCEDURE — 90746 HEPB VACCINE 3 DOSE ADULT IM: CPT | Performed by: FAMILY MEDICINE

## 2020-02-05 PROCEDURE — 99395 PREV VISIT EST AGE 18-39: CPT | Performed by: FAMILY MEDICINE

## 2020-02-05 PROCEDURE — 90691 TYPHOID VACCINE IM: CPT | Performed by: FAMILY MEDICINE

## 2020-02-05 PROCEDURE — 90715 TDAP VACCINE 7 YRS/> IM: CPT | Performed by: FAMILY MEDICINE

## 2020-02-05 PROCEDURE — 90472 IMMUNIZATION ADMIN EACH ADD: CPT | Performed by: FAMILY MEDICINE

## 2020-02-05 RX ORDER — DIPHENOXYLATE HYDROCHLORIDE AND ATROPINE SULFATE 2.5; .025 MG/1; MG/1
1 TABLET ORAL 4 TIMES DAILY PRN
Qty: 30 TABLET | Refills: 1 | Status: SHIPPED | OUTPATIENT
Start: 2020-02-05 | End: 2020-02-07 | Stop reason: SDUPTHER

## 2020-02-05 NOTE — PROGRESS NOTES
"Rooming Tab(CC,VS,Pt Hx,Fall Screen)  Chief Complaint   Patient presents with   • Immunizations     Discuss vaccines for travel to [place]       Subjective 39-year-old here for immunization advice on his travel to [place] and early October.  He is healthy and not up-to-date on his tetanus.  He has never had a typhoid immunization.  He has never had hepatitis B immunizations but he did have hepatitis a during the outbreak a couple of years ago.  He does not have any other significant medical issues other than his gout and hyperlipidemia which are under control.    I have reviewed and updated his medications, medical history and problem list during today's office visit.     Patient Care Team:  Keo Morris MD as PCP - General (Family Medicine)  Keo Morris MD    Problem List Tab  Medications Tab  Synopsis Tab  Chart Review Tab  Care Everywhere Tab  Immunizations Tab  Patient History Tab    Social History     Tobacco Use   • Smoking status: Never Smoker   • Smokeless tobacco: Never Used   Substance Use Topics   • Alcohol use: Yes       Review of Systems   Constitutional: Negative for chills, fatigue and fever.   HENT: Negative for nosebleeds.    Eyes: Negative for double vision.   Respiratory: Negative for chest tightness and shortness of breath.    Cardiovascular: Negative for chest pain and palpitations.   Gastrointestinal: Negative for blood in stool.   Genitourinary: Negative for dysuria and hematuria.   Neurological: Negative for dizziness and syncope.   Psychiatric/Behavioral: Negative for depressed mood.       Objective     Rooming Tab(CC,VS,Pt Hx,Fall Screen)  /76 (BP Location: Right arm, Patient Position: Sitting, Cuff Size: Large Adult)   Pulse 73   Temp 97.9 °F (36.6 °C) (Oral)   Resp 20   Ht 190.5 cm (75\")   Wt 110 kg (243 lb)   SpO2 99%   BMI 30.37 kg/m²     Body mass index is 30.37 kg/m².    Physical Exam   Constitutional: He is oriented to person, place, and time. He appears " well-developed and well-nourished. No distress.   HENT:   Head: Normocephalic and atraumatic.   Nose: Nose normal.   Mouth/Throat: Oropharynx is clear and moist.   Eyes: Pupils are equal, round, and reactive to light. Conjunctivae, EOM and lids are normal.   Neck: Trachea normal and normal range of motion. Neck supple. No JVD present. Carotid bruit is not present. No thyroid mass and no thyromegaly present.   No carotid bruits   Cardiovascular: Normal rate, regular rhythm, normal heart sounds and intact distal pulses.   Pulmonary/Chest: Effort normal and breath sounds normal.   Musculoskeletal:   No c/c/e   Neurological: He is alert and oriented to person, place, and time. No cranial nerve deficit.   Skin: Skin is warm and dry.   Psychiatric: He has a normal mood and affect. His speech is normal and behavior is normal. He is attentive.   Nursing note and vitals reviewed.       Statin Choice Calculator  Data Reviewed:                   Assessment/Plan   Order Review Tab  Health Maintenance Tab  Patient Plan/Order Tab  Diagnoses and all orders for this visit:    1. Need for vaccination (Primary)  Assessment & Plan:  Recommend he get Tdap, typhoid and hepatitis B series.  Once again if he wants to get meningitis series he can do that but this is not absolutely necessary for his travel.  We will look up the CDC recommendations for malaria and call that into his pharmacy as well.      2. Immunization due  -     Typhoid VICPS Vaccine Im  -     Tdap Vaccine Greater Than or Equal To 6yo IM  -     Hepatitis B Vaccine Adult IM    Other orders  -     diphenoxylate-atropine (LOMOTIL) 2.5-0.025 MG per tablet; Take 1 tablet by mouth 4 (Four) Times a Day As Needed for Diarrhea.  Dispense: 30 tablet; Refill: 1      Wrapup Tab  Return if symptoms worsen or fail to improve.

## 2020-02-06 PROBLEM — D64.9 ANEMIA: Status: ACTIVE | Noted: 2019-02-05

## 2020-02-06 PROBLEM — Z23 NEED FOR VACCINATION: Status: ACTIVE | Noted: 2020-02-06

## 2020-02-06 PROBLEM — E78.5 HYPERLIPIDEMIA: Status: ACTIVE | Noted: 2020-02-06

## 2020-02-06 RX ORDER — SIMVASTATIN 40 MG
TABLET ORAL
Qty: 90 TABLET | Refills: 4 | Status: SHIPPED | OUTPATIENT
Start: 2020-02-06 | End: 2021-02-21 | Stop reason: SDUPTHER

## 2020-02-06 RX ORDER — ESOMEPRAZOLE MAGNESIUM 40 MG/1
CAPSULE, DELAYED RELEASE ORAL
Qty: 90 CAPSULE | Refills: 4 | Status: SHIPPED | OUTPATIENT
Start: 2020-02-06 | End: 2021-02-21 | Stop reason: SDUPTHER

## 2020-02-07 ENCOUNTER — TELEPHONE (OUTPATIENT)
Dept: FAMILY MEDICINE CLINIC | Facility: CLINIC | Age: 40
End: 2020-02-07

## 2020-02-07 RX ORDER — DIPHENOXYLATE HYDROCHLORIDE AND ATROPINE SULFATE 2.5; .025 MG/1; MG/1
1 TABLET ORAL 4 TIMES DAILY PRN
Qty: 30 TABLET | Refills: 1 | Status: SHIPPED | OUTPATIENT
Start: 2020-02-07 | End: 2021-05-18

## 2020-02-07 NOTE — TELEPHONE ENCOUNTER
Wife  left a voice message that she thought you were sending in Lomotil and a mylaria medication for her.  They were not at the pharmacy.  Please send in today.

## 2020-02-10 ENCOUNTER — TELEPHONE (OUTPATIENT)
Dept: FAMILY MEDICINE CLINIC | Facility: CLINIC | Age: 40
End: 2020-02-10

## 2020-02-10 RX ORDER — ATOVAQUONE AND PROGUANIL HYDROCHLORIDE 250; 100 MG/1; MG/1
TABLET, FILM COATED ORAL
Qty: 26 TABLET | Refills: 0 | Status: SHIPPED | OUTPATIENT
Start: 2020-02-10 | End: 2020-02-10 | Stop reason: SDUPTHER

## 2020-02-10 RX ORDER — PHENTERMINE HYDROCHLORIDE 37.5 MG/1
37.5 CAPSULE ORAL EVERY MORNING
Qty: 30 CAPSULE | Refills: 0 | Status: SHIPPED | OUTPATIENT
Start: 2020-02-10 | End: 2020-03-16

## 2020-02-10 RX ORDER — ATOVAQUONE AND PROGUANIL HYDROCHLORIDE 250; 100 MG/1; MG/1
TABLET, FILM COATED ORAL
Qty: 26 TABLET | Refills: 0 | Status: SHIPPED | OUTPATIENT
Start: 2020-02-10 | End: 2021-05-18

## 2020-02-10 NOTE — TELEPHONE ENCOUNTER
Wife left a voice message that she is sorry she has not been able to take your calls.  They will be traveling to [facility] and will be gone two and a half weeks.  They will be staying in [place] (spelling??), going [place] a [place]ari in [place] [facility] (which [place] of [facility] and near [facility] and [facility]), and then [facility].  Wife does think there are cases of mylaria in [facility].  If this is not enough info, please call and she will try to answer.

## 2020-03-05 ENCOUNTER — CLINICAL SUPPORT (OUTPATIENT)
Dept: FAMILY MEDICINE CLINIC | Facility: CLINIC | Age: 40
End: 2020-03-05

## 2020-03-05 DIAGNOSIS — Z23 NEED FOR VACCINATION: Primary | ICD-10-CM

## 2020-03-05 PROCEDURE — 90471 IMMUNIZATION ADMIN: CPT | Performed by: FAMILY MEDICINE

## 2020-03-05 PROCEDURE — 90744 HEPB VACC 3 DOSE PED/ADOL IM: CPT | Performed by: FAMILY MEDICINE

## 2020-03-16 RX ORDER — PHENTERMINE HYDROCHLORIDE 37.5 MG/1
37.5 CAPSULE ORAL EVERY MORNING
Qty: 30 CAPSULE | Refills: 0 | Status: SHIPPED | OUTPATIENT
Start: 2020-03-16 | End: 2020-06-01

## 2020-03-17 ENCOUNTER — TELEPHONE (OUTPATIENT)
Dept: FAMILY MEDICINE CLINIC | Facility: CLINIC | Age: 40
End: 2020-03-17

## 2020-03-17 NOTE — TELEPHONE ENCOUNTER
Patients wife called in asking is some Malerial disease medication could be called in for him at his pharmacy called  [facility]

## 2020-04-29 DIAGNOSIS — Z20.822 EXPOSURE TO COVID-19 VIRUS: Primary | ICD-10-CM

## 2020-04-29 NOTE — PROGRESS NOTES
Mailed patient lab order for SARS-CoV-2 Antibody, IgM, SARS-CoV-2 Antibody, IgG and SARS-CoV-2 Antibody, IgA

## 2020-05-08 LAB — SARS-COV-2 IGG SERPL QL IA: NEGATIVE

## 2020-05-21 DIAGNOSIS — F51.01 PRIMARY INSOMNIA: ICD-10-CM

## 2020-05-21 RX ORDER — ZOLPIDEM TARTRATE 10 MG/1
10 TABLET ORAL NIGHTLY PRN
Qty: 90 TABLET | Refills: 1 | Status: SHIPPED | OUTPATIENT
Start: 2020-05-21 | End: 2020-11-11

## 2020-06-01 RX ORDER — PHENTERMINE HYDROCHLORIDE 37.5 MG/1
37.5 CAPSULE ORAL EVERY MORNING
Qty: 30 CAPSULE | Refills: 1 | Status: SHIPPED | OUTPATIENT
Start: 2020-06-01 | End: 2021-05-18 | Stop reason: SDUPTHER

## 2020-08-04 ENCOUNTER — TELEPHONE (OUTPATIENT)
Dept: FAMILY MEDICINE CLINIC | Facility: CLINIC | Age: 40
End: 2020-08-04

## 2020-08-04 NOTE — TELEPHONE ENCOUNTER
Patient's wife, Ernestine, called stating that the patient is coming in to get his 3rd hep shot 08/05/2020 and would like to know if he could also get the pneumonia vaccine at the same time. Please advise.     Ernestine can be reached at: [phone]

## 2020-08-05 ENCOUNTER — CLINICAL SUPPORT (OUTPATIENT)
Dept: FAMILY MEDICINE CLINIC | Facility: CLINIC | Age: 40
End: 2020-08-05

## 2020-08-05 DIAGNOSIS — Z23 IMMUNIZATION DUE: Primary | ICD-10-CM

## 2020-08-05 PROCEDURE — 90471 IMMUNIZATION ADMIN: CPT | Performed by: FAMILY MEDICINE

## 2020-08-05 PROCEDURE — 90746 HEPB VACCINE 3 DOSE ADULT IM: CPT | Performed by: FAMILY MEDICINE

## 2020-11-10 DIAGNOSIS — F51.01 PRIMARY INSOMNIA: ICD-10-CM

## 2020-11-11 RX ORDER — ZOLPIDEM TARTRATE 10 MG/1
TABLET ORAL
Qty: 90 TABLET | Refills: 1 | Status: SHIPPED | OUTPATIENT
Start: 2020-11-11 | End: 2020-11-12 | Stop reason: SDUPTHER

## 2020-11-12 DIAGNOSIS — F51.01 PRIMARY INSOMNIA: ICD-10-CM

## 2020-11-12 RX ORDER — ZOLPIDEM TARTRATE 10 MG/1
10 TABLET ORAL NIGHTLY PRN
Qty: 90 TABLET | Refills: 1 | Status: SHIPPED | OUTPATIENT
Start: 2020-11-12 | End: 2021-04-19 | Stop reason: SDUPTHER

## 2020-11-12 NOTE — TELEPHONE ENCOUNTER
Caller: BETTIE BISHOP    Relationship: Spouse    Best call back number: [phone]    Medication needed:   Requested Prescriptions     Pending Prescriptions Disp Refills   • zolpidem (AMBIEN) 10 MG tablet 90 tablet 1     Sig: Take 1 tablet by mouth At Night As Needed for Sleep.       When do you need the refill by: 11/13/20  What details did the patient provide when requesting the medication: FEW DOSES LEFT     Does the patient have less than a 3 day supply:  [x] Yes  [] No    What is the patient's preferred pharmacy: [facility] - [address] [phone] [facility] [phone]

## 2021-02-22 RX ORDER — ALLOPURINOL 300 MG/1
300 TABLET ORAL DAILY
Qty: 90 TABLET | Refills: 4 | Status: SHIPPED | OUTPATIENT
Start: 2021-02-22 | End: 2022-03-11

## 2021-02-22 RX ORDER — SIMVASTATIN 40 MG
40 TABLET ORAL DAILY
Qty: 90 TABLET | Refills: 4 | Status: SHIPPED | OUTPATIENT
Start: 2021-02-22 | End: 2022-03-01

## 2021-02-22 RX ORDER — ESOMEPRAZOLE MAGNESIUM 40 MG/1
40 CAPSULE, DELAYED RELEASE ORAL DAILY
Qty: 90 CAPSULE | Refills: 4 | Status: SHIPPED | OUTPATIENT
Start: 2021-02-22 | End: 2022-03-01

## 2021-04-19 DIAGNOSIS — F51.01 PRIMARY INSOMNIA: ICD-10-CM

## 2021-04-19 RX ORDER — ZOLPIDEM TARTRATE 10 MG/1
10 TABLET ORAL NIGHTLY PRN
Qty: 90 TABLET | Refills: 1 | Status: SHIPPED | OUTPATIENT
Start: 2021-04-19 | End: 2021-12-14

## 2021-04-20 DIAGNOSIS — F51.01 PRIMARY INSOMNIA: ICD-10-CM

## 2021-04-20 RX ORDER — ZOLPIDEM TARTRATE 10 MG/1
10 TABLET ORAL NIGHTLY PRN
Qty: 90 TABLET | Refills: 1 | Status: CANCELLED | OUTPATIENT
Start: 2021-04-20

## 2021-04-20 NOTE — TELEPHONE ENCOUNTER
Caller: Talon Manning    Relationship: Self    Best call back number: [phone]    Medication needed:   Requested Prescriptions     Pending Prescriptions Disp Refills   • zolpidem (AMBIEN) 10 MG tablet 90 tablet 1     Sig: Take 1 tablet by mouth At Night As Needed for Sleep.       When do you need the refill by: TODAY    What additional details did the patient provide when requesting the medication: PATIENT IS OUT    Does the patient have less than a 3 day supply:  [x] Yes  [] No    What is the patient's preferred pharmacy: [facility] - [address] [phone] [facility] [phone]

## 2021-04-29 ENCOUNTER — TELEPHONE (OUTPATIENT)
Dept: FAMILY MEDICINE CLINIC | Facility: CLINIC | Age: 41
End: 2021-04-29

## 2021-04-29 NOTE — TELEPHONE ENCOUNTER
Caller: SAPNA BISHOP    Relationship: Spouse    Best call back number: [phone]    What is the best time to reach you: ANYTIME    Who are you requesting to speak with (clinical staff, provider,  specific staff member): CLINICAL STAFF    Do you know the name of the person who called: SAPNA BISHOP    What was the call regarding: PATIENT IS WANTING TO SPEAK WITH SOMEONE ABOUT HER 'S VACCINATION RECORD SO SHE CAN BE SURE SHE HAS AN UP TO DATE RECORD    SHE HAS DOWN THAT HER  HAD HIS TYPHOID, TETANUS, HEPATITIS A, AND RABIES     WIFE IS ON PATIENT'S BH VERBAL    Do you require a callback: YES

## 2021-05-18 ENCOUNTER — LAB (OUTPATIENT)
Dept: FAMILY MEDICINE CLINIC | Facility: CLINIC | Age: 41
End: 2021-05-18

## 2021-05-18 ENCOUNTER — OFFICE VISIT (OUTPATIENT)
Dept: FAMILY MEDICINE CLINIC | Facility: CLINIC | Age: 41
End: 2021-05-18

## 2021-05-18 VITALS
OXYGEN SATURATION: 98 % | HEART RATE: 76 BPM | WEIGHT: 248 LBS | DIASTOLIC BLOOD PRESSURE: 88 MMHG | HEIGHT: 75 IN | BODY MASS INDEX: 30.84 KG/M2 | TEMPERATURE: 98.2 F | SYSTOLIC BLOOD PRESSURE: 122 MMHG

## 2021-05-18 DIAGNOSIS — E79.0 HYPERURICEMIA: ICD-10-CM

## 2021-05-18 DIAGNOSIS — E66.3 OVERWEIGHT: ICD-10-CM

## 2021-05-18 DIAGNOSIS — Z00.00 PREVENTATIVE HEALTH CARE: ICD-10-CM

## 2021-05-18 DIAGNOSIS — Z00.00 PREVENTATIVE HEALTH CARE: Primary | ICD-10-CM

## 2021-05-18 PROCEDURE — 84550 ASSAY OF BLOOD/URIC ACID: CPT | Performed by: NURSE PRACTITIONER

## 2021-05-18 PROCEDURE — 84443 ASSAY THYROID STIM HORMONE: CPT | Performed by: NURSE PRACTITIONER

## 2021-05-18 PROCEDURE — 99212 OFFICE O/P EST SF 10 MIN: CPT | Performed by: NURSE PRACTITIONER

## 2021-05-18 PROCEDURE — 99396 PREV VISIT EST AGE 40-64: CPT | Performed by: NURSE PRACTITIONER

## 2021-05-18 PROCEDURE — 80053 COMPREHEN METABOLIC PANEL: CPT | Performed by: NURSE PRACTITIONER

## 2021-05-18 PROCEDURE — 85025 COMPLETE CBC W/AUTO DIFF WBC: CPT | Performed by: NURSE PRACTITIONER

## 2021-05-18 PROCEDURE — 36415 COLL VENOUS BLD VENIPUNCTURE: CPT

## 2021-05-18 PROCEDURE — 80061 LIPID PANEL: CPT | Performed by: NURSE PRACTITIONER

## 2021-05-18 RX ORDER — TADALAFIL 20 MG/1
20 TABLET ORAL DAILY PRN
COMMUNITY
End: 2021-12-14 | Stop reason: SDUPTHER

## 2021-05-18 RX ORDER — PHENTERMINE HYDROCHLORIDE 37.5 MG/1
37.5 CAPSULE ORAL EVERY MORNING
Qty: 30 CAPSULE | Refills: 1 | Status: SHIPPED | OUTPATIENT
Start: 2021-05-18 | End: 2021-08-05 | Stop reason: RX

## 2021-05-18 NOTE — PROGRESS NOTES
"Subjective   Talon Manning is a 41 y.o. male.     Chief Complaint   Patient presents with   • Establish Care   • Annual Exam       /88 (BP Location: Right arm, Patient Position: Sitting, Cuff Size: Large Adult)   Pulse 76   Temp 98.2 °F (36.8 °C) (Skin)   Ht 190.5 cm (75\")   Wt 112 kg (248 lb)   SpO2 98%   BMI 31.00 kg/m²     BP Readings from Last 3 Encounters:   05/18/21 122/88   02/05/20 124/76   07/09/19 143/88       Wt Readings from Last 3 Encounters:   05/18/21 112 kg (248 lb)   02/05/20 110 kg (243 lb)   07/09/19 103 kg (226 lb 10.1 oz)       Pt comes in today for routine physical and to get established.   No specific concerns today.   Would like refill on phentermine. Will only take it for 1-3 months. Hasn't had this in several years.        The following portions of the patient's history were reviewed and updated as appropriate: allergies, current medications, past family history, past medical history, past social history, past surgical history and problem list.    Review of Systems   Constitutional: Negative for activity change, unexpected weight gain and unexpected weight loss.   Eyes: Negative for visual disturbance.   Respiratory: Negative for chest tightness and shortness of breath.    Cardiovascular: Negative for chest pain, palpitations and leg swelling.   Gastrointestinal: Negative for abdominal pain, blood in stool, constipation, diarrhea and indigestion.   Endocrine: Negative for polydipsia and polyuria.   Genitourinary: Negative for difficulty urinating.   Skin: Negative for skin lesions.   Neurological: Negative for headache.   Psychiatric/Behavioral: Negative for agitation, sleep disturbance and stress.       Objective   Physical Exam  Constitutional:       Appearance: He is well-developed.   HENT:      Head: Normocephalic.   Eyes:      Conjunctiva/sclera: Conjunctivae normal.      Pupils: Pupils are equal, round, and reactive to light.   Neck:      Thyroid: No thyromegaly. "   Cardiovascular:      Rate and Rhythm: Normal rate and regular rhythm.      Heart sounds: No murmur heard.     Pulmonary:      Effort: Pulmonary effort is normal.      Breath sounds: Normal breath sounds.   Abdominal:      General: Bowel sounds are normal.      Palpations: Abdomen is soft.      Tenderness: There is no abdominal tenderness.   Musculoskeletal:         General: Normal range of motion.      Cervical back: Normal range of motion and neck supple.   Skin:     General: Skin is warm and dry.      Findings: No lesion.   Neurological:      Mental Status: He is alert and oriented to person, place, and time.   Psychiatric:         Behavior: Behavior normal.           Diagnoses and all orders for this visit:    1. Preventative health care (Primary)  -     phentermine 37.5 MG capsule; Take 1 capsule by mouth Every Morning.  Dispense: 30 capsule; Refill: 1  -     Comprehensive Metabolic Panel; Future  -     CBC & Differential; Future  -     Lipid panel; Future  -     Uric Acid; Future  -     TSH; Future    2. Hyperuricemia  -     Uric Acid; Future    3. Overweight    check labs  Phentermine  Monitor BP at home and keep diary  During this visit for their annual exam, we reviewed their personal history, social history and family history. We went over their medications and all the recommended health maintenance items for their age group. They were given the opportunity to ask questions and discuss other concerns.       Return in about 1 year (around 5/18/2022) for Annual physical.

## 2021-05-19 LAB
ALBUMIN SERPL-MCNC: 4.5 G/DL (ref 3.5–5.2)
ALBUMIN/GLOB SERPL: 1.6 G/DL
ALP SERPL-CCNC: 51 U/L (ref 39–117)
ALT SERPL W P-5'-P-CCNC: 46 U/L (ref 1–41)
ANION GAP SERPL CALCULATED.3IONS-SCNC: 10.7 MMOL/L (ref 5–15)
AST SERPL-CCNC: 30 U/L (ref 1–40)
BASOPHILS # BLD AUTO: 0.03 10*3/MM3 (ref 0–0.2)
BASOPHILS NFR BLD AUTO: 0.4 % (ref 0–1.5)
BILIRUB SERPL-MCNC: 0.2 MG/DL (ref 0–1.2)
BUN SERPL-MCNC: 10 MG/DL (ref 6–20)
BUN/CREAT SERPL: 9.3 (ref 7–25)
CALCIUM SPEC-SCNC: 9.1 MG/DL (ref 8.6–10.5)
CHLORIDE SERPL-SCNC: 104 MMOL/L (ref 98–107)
CHOLEST SERPL-MCNC: 163 MG/DL (ref 0–200)
CO2 SERPL-SCNC: 27.3 MMOL/L (ref 22–29)
CREAT SERPL-MCNC: 1.08 MG/DL (ref 0.76–1.27)
DEPRECATED RDW RBC AUTO: 39.7 FL (ref 37–54)
EOSINOPHIL # BLD AUTO: 0.22 10*3/MM3 (ref 0–0.4)
EOSINOPHIL NFR BLD AUTO: 3.2 % (ref 0.3–6.2)
ERYTHROCYTE [DISTWIDTH] IN BLOOD BY AUTOMATED COUNT: 12.4 % (ref 12.3–15.4)
GFR SERPL CREATININE-BSD FRML MDRD: 76 ML/MIN/1.73
GLOBULIN UR ELPH-MCNC: 2.8 GM/DL
GLUCOSE SERPL-MCNC: 89 MG/DL (ref 65–99)
HCT VFR BLD AUTO: 41.2 % (ref 37.5–51)
HDLC SERPL-MCNC: 39 MG/DL (ref 40–60)
HGB BLD-MCNC: 13.9 G/DL (ref 13–17.7)
IMM GRANULOCYTES # BLD AUTO: 0.02 10*3/MM3 (ref 0–0.05)
IMM GRANULOCYTES NFR BLD AUTO: 0.3 % (ref 0–0.5)
LDLC SERPL CALC-MCNC: 72 MG/DL (ref 0–100)
LDLC/HDLC SERPL: 1.51 {RATIO}
LYMPHOCYTES # BLD AUTO: 2.3 10*3/MM3 (ref 0.7–3.1)
LYMPHOCYTES NFR BLD AUTO: 34 % (ref 19.6–45.3)
MCH RBC QN AUTO: 29.9 PG (ref 26.6–33)
MCHC RBC AUTO-ENTMCNC: 33.7 G/DL (ref 31.5–35.7)
MCV RBC AUTO: 88.6 FL (ref 79–97)
MONOCYTES # BLD AUTO: 0.54 10*3/MM3 (ref 0.1–0.9)
MONOCYTES NFR BLD AUTO: 8 % (ref 5–12)
NEUTROPHILS NFR BLD AUTO: 3.66 10*3/MM3 (ref 1.7–7)
NEUTROPHILS NFR BLD AUTO: 54.1 % (ref 42.7–76)
NRBC BLD AUTO-RTO: 0 /100 WBC (ref 0–0.2)
PLATELET # BLD AUTO: 296 10*3/MM3 (ref 140–450)
PMV BLD AUTO: 10.5 FL (ref 6–12)
POTASSIUM SERPL-SCNC: 4.2 MMOL/L (ref 3.5–5.2)
PROT SERPL-MCNC: 7.3 G/DL (ref 6–8.5)
RBC # BLD AUTO: 4.65 10*6/MM3 (ref 4.14–5.8)
SODIUM SERPL-SCNC: 142 MMOL/L (ref 136–145)
TRIGL SERPL-MCNC: 326 MG/DL (ref 0–150)
TSH SERPL DL<=0.05 MIU/L-ACNC: 1.28 UIU/ML (ref 0.27–4.2)
URATE SERPL-MCNC: 4.4 MG/DL (ref 3.4–7)
VLDLC SERPL-MCNC: 52 MG/DL (ref 5–40)
WBC # BLD AUTO: 6.77 10*3/MM3 (ref 3.4–10.8)

## 2021-08-05 RX ORDER — PHENTERMINE HYDROCHLORIDE 37.5 MG/1
37.5 TABLET ORAL
Qty: 30 TABLET | Refills: 0 | Status: SHIPPED | OUTPATIENT
Start: 2021-08-05 | End: 2021-09-27

## 2021-08-05 NOTE — TELEPHONE ENCOUNTER
Pharmacy Name: [id number] - [place], [place] - [address] [facility] OF [address] & [facility] [phone] [facility] [phone] FX       What medication are you calling in regards to: phentermine 37.5 MG capsule    What question does the pharmacy have: PHARMACY IS OUT OF CAPSULE BUT CAN SUBSTITUTE WITH THE TABLET VERSION. REQUEST SENT ON MONDAY

## 2021-09-27 RX ORDER — PHENTERMINE HYDROCHLORIDE 37.5 MG/1
37.5 TABLET ORAL
Qty: 30 TABLET | Refills: 0 | Status: SHIPPED | OUTPATIENT
Start: 2021-09-27 | End: 2021-12-14

## 2021-12-10 ENCOUNTER — TELEPHONE (OUTPATIENT)
Dept: FAMILY MEDICINE CLINIC | Facility: CLINIC | Age: 41
End: 2021-12-10

## 2021-12-10 NOTE — TELEPHONE ENCOUNTER
Gave message to patient's wife and scheduled an appt with [facility] on 12/14/2021 at 10:15am.  They are out of town today so he couldn't come in today.

## 2021-12-10 NOTE — TELEPHONE ENCOUNTER
Caller: SAPNA BISHOP    Relationship to patient: Spouse    Best call back number: [phone]    Patient is needing: PATIENT'S WIFE CALLED AND SAID THAT HIS RIGHT SHOULDER HAS BEEN HURTING FOR ABOUT 6 WEEKS    HE IS ABLE TO LIFT HIS ARM AS NORMAL, HE SAID IT FEELS LIKE IT HURTS WHEN HE IS PULLING IT TOWARDS HIM, HURTING IN FRONT OF THE SHOULDER     HIS WIFE IS WANTING TO SEE IF MACY HALE WANTS TO SEE HIM IN THE OFFICE FOR THIS FIRST, OR IF SHE WOULD LIKE TO ORDER ANY IMAGING

## 2021-12-14 ENCOUNTER — OFFICE VISIT (OUTPATIENT)
Dept: FAMILY MEDICINE CLINIC | Facility: CLINIC | Age: 41
End: 2021-12-14

## 2021-12-14 VITALS
RESPIRATION RATE: 16 BRPM | HEIGHT: 75 IN | WEIGHT: 251 LBS | BODY MASS INDEX: 31.21 KG/M2 | DIASTOLIC BLOOD PRESSURE: 98 MMHG | OXYGEN SATURATION: 97 % | TEMPERATURE: 96.8 F | HEART RATE: 100 BPM | SYSTOLIC BLOOD PRESSURE: 154 MMHG

## 2021-12-14 DIAGNOSIS — R03.0 ELEVATED BLOOD PRESSURE READING WITHOUT DIAGNOSIS OF HYPERTENSION: ICD-10-CM

## 2021-12-14 DIAGNOSIS — G89.29 CHRONIC RIGHT SHOULDER PAIN: Primary | ICD-10-CM

## 2021-12-14 DIAGNOSIS — M25.511 CHRONIC RIGHT SHOULDER PAIN: Primary | ICD-10-CM

## 2021-12-14 PROCEDURE — 99214 OFFICE O/P EST MOD 30 MIN: CPT | Performed by: NURSE PRACTITIONER

## 2021-12-14 RX ORDER — MELOXICAM 15 MG/1
15 TABLET ORAL DAILY
Qty: 30 TABLET | Refills: 2 | Status: SHIPPED | OUTPATIENT
Start: 2021-12-14 | End: 2022-03-01

## 2021-12-14 RX ORDER — TADALAFIL 20 MG/1
20 TABLET ORAL DAILY PRN
Qty: 30 TABLET | Refills: 2 | Status: SHIPPED | OUTPATIENT
Start: 2021-12-14 | End: 2022-08-09 | Stop reason: SDUPTHER

## 2021-12-14 RX ORDER — CETIRIZINE HYDROCHLORIDE 10 MG/1
TABLET ORAL
COMMUNITY
Start: 2021-09-07

## 2021-12-14 NOTE — PROGRESS NOTES
"Chief Complaint  Shoulder Pain (right)  Subjective        Talon Manning presents to [facility]  Pt comes in today wt c/o right shoulder pain.   About 12 weeks ago was doing some work overhead and had some pain, but it improved. Had soreness for first 3-4 days and then improved.   Then about 6 weeks ago was holding up a panel in the ceiling and when he put his arm down it started to burn and caused pain. Pain in anterior shoulder. Now with ongoing pain since. Certain movements cause pain. Pulling motion hurts the most.  No numbness or radiating pain.        Objective     Vital Signs:   /98 (BP Location: Left arm)   Pulse 100   Temp 96.8 °F (36 °C) (Infrared)   Resp 16   Ht 190.5 cm (75\")   Wt 114 kg (251 lb)   SpO2 97%   BMI 31.37 kg/m²       BP Readings from Last 3 Encounters:   12/14/21 154/98   05/18/21 122/88   02/05/20 124/76       Wt Readings from Last 3 Encounters:   12/14/21 114 kg (251 lb)   05/18/21 112 kg (248 lb)   02/05/20 110 kg (243 lb)     Physical Exam  Constitutional:       Appearance: He is well-developed.   Eyes:      Pupils: Pupils are equal, round, and reactive to light.   Cardiovascular:      Rate and Rhythm: Normal rate and regular rhythm.   Pulmonary:      Effort: Pulmonary effort is normal.      Breath sounds: Normal breath sounds.   Musculoskeletal:      Right shoulder: No swelling, deformity, tenderness or crepitus. Decreased range of motion. Normal strength.   Neurological:      Mental Status: He is alert and oriented to person, place, and time.        Result Review :                 Assessment and Plan    Diagnoses and all orders for this visit:    1. Chronic right shoulder pain (Primary)  -     meloxicam (Mobic) 15 MG tablet; Take 1 tablet by mouth Daily.  Dispense: 30 tablet; Refill: 2  -     Ambulatory Referral to Physical Therapy Evaluate and treat    2. Elevated blood pressure reading without diagnosis of hypertension  Assessment " & Plan:  Repeat /98. No issues with BP in the past. Will f/u in 1-2 weeks for BP check. If still elevated will start medication therapy.       Other orders  -     tadalafil (CIALIS) 20 MG tablet; Take 1 tablet by mouth Daily As Needed for Erectile Dysfunction.  Dispense: 30 tablet; Refill: 2  start mobic  Referral to PT  F/u in 1-2 weeks for BP check  During this office visit, we discussed the pertinent aspects of the visit and treatment recommendations. Pt verbalizes understanding. Follow up was discussed. Patient was given the opportunity to ask questions and discuss other concerns.         Follow Up   Return if symptoms worsen or fail to improve.  Patient was given instructions and counseling regarding his condition or for health maintenance advice. Please see specific information pulled into the AVS if appropriate.

## 2021-12-14 NOTE — ASSESSMENT & PLAN NOTE
Repeat /98. No issues with BP in the past. Will f/u in 1-2 weeks for BP check. If still elevated will start medication therapy.

## 2022-01-04 RX ORDER — PHENTERMINE HYDROCHLORIDE 37.5 MG/1
37.5 TABLET ORAL
Qty: 30 TABLET | Refills: 0 | Status: SHIPPED | OUTPATIENT
Start: 2022-01-04 | End: 2022-05-25

## 2022-02-28 DIAGNOSIS — G89.29 CHRONIC RIGHT SHOULDER PAIN: ICD-10-CM

## 2022-02-28 DIAGNOSIS — M25.511 CHRONIC RIGHT SHOULDER PAIN: ICD-10-CM

## 2022-03-01 RX ORDER — MELOXICAM 15 MG/1
15 TABLET ORAL DAILY
Qty: 30 TABLET | Refills: 2 | Status: SHIPPED | OUTPATIENT
Start: 2022-03-01 | End: 2022-03-02 | Stop reason: SDUPTHER

## 2022-03-01 RX ORDER — ESOMEPRAZOLE MAGNESIUM 40 MG/1
CAPSULE, DELAYED RELEASE ORAL
Qty: 90 CAPSULE | Refills: 3 | Status: SHIPPED | OUTPATIENT
Start: 2022-03-01

## 2022-03-01 RX ORDER — SIMVASTATIN 40 MG
TABLET ORAL
Qty: 90 TABLET | Refills: 3 | Status: SHIPPED | OUTPATIENT
Start: 2022-03-01 | End: 2023-03-30

## 2022-03-02 ENCOUNTER — TELEPHONE (OUTPATIENT)
Dept: FAMILY MEDICINE CLINIC | Facility: CLINIC | Age: 42
End: 2022-03-02

## 2022-03-02 DIAGNOSIS — G89.29 CHRONIC RIGHT SHOULDER PAIN: ICD-10-CM

## 2022-03-02 DIAGNOSIS — M25.511 CHRONIC RIGHT SHOULDER PAIN: ICD-10-CM

## 2022-03-02 RX ORDER — MELOXICAM 15 MG/1
15 TABLET ORAL DAILY
Qty: 90 TABLET | Refills: 2 | Status: SHIPPED | OUTPATIENT
Start: 2022-03-02 | End: 2022-05-25

## 2022-03-02 NOTE — TELEPHONE ENCOUNTER
Incoming Refill Request      Medication requested (name and dose):   meloxicam (MOBIC) 15 MG tablet  15 mg, Daily 2 ordered         Pharmacy where request should be sent: [facility] - [address] - [phone] [facility] [phone] [phone]    Additional details provided by patient: NONE    Best call back number: [phone]    Does the patient have less than a 3 day supply:  [x] Yes  [] No    Mayco Sánchez Rep  03/02/22, 11:49 EST

## 2022-03-11 RX ORDER — ALLOPURINOL 300 MG/1
TABLET ORAL
Qty: 90 TABLET | Refills: 3 | Status: SHIPPED | OUTPATIENT
Start: 2022-03-11

## 2022-03-25 ENCOUNTER — TELEPHONE (OUTPATIENT)
Dept: FAMILY MEDICINE CLINIC | Facility: CLINIC | Age: 42
End: 2022-03-25

## 2022-03-25 NOTE — TELEPHONE ENCOUNTER
Caller: Talon Manning    Relationship: Self    Best call back number: [phone]    What medication are you requesting: WHAT EVER IS RECOMENDED    What are your current symptoms: STARTED WITH LOOSE STOOL AND NO HAS FREQUENT URINATION, FEVER, CHILLS    How long have you been experiencing symptoms: 5 DAYS    If a prescription is needed, what is your preferred pharmacy and phone number: [id number] - [place], [place] - [address] AT [address] OF [facility] & [facility] [phone] [facility] [phone]      Additional notes: PATIENT IS WORRIED THIS MAY POSSIBLY BE A PARASITE. JUST GOT BACK FROM [facility]

## 2022-05-25 ENCOUNTER — OFFICE VISIT (OUTPATIENT)
Dept: FAMILY MEDICINE CLINIC | Facility: CLINIC | Age: 42
End: 2022-05-25

## 2022-05-25 VITALS
DIASTOLIC BLOOD PRESSURE: 102 MMHG | HEART RATE: 84 BPM | RESPIRATION RATE: 15 BRPM | TEMPERATURE: 97.5 F | OXYGEN SATURATION: 97 % | WEIGHT: 250 LBS | SYSTOLIC BLOOD PRESSURE: 154 MMHG | HEIGHT: 75 IN | BODY MASS INDEX: 31.08 KG/M2

## 2022-05-25 DIAGNOSIS — I10 PRIMARY HYPERTENSION: Primary | ICD-10-CM

## 2022-05-25 DIAGNOSIS — I10 PRIMARY HYPERTENSION: ICD-10-CM

## 2022-05-25 PROCEDURE — 99214 OFFICE O/P EST MOD 30 MIN: CPT | Performed by: NURSE PRACTITIONER

## 2022-05-25 RX ORDER — COLESEVELAM 180 1/1
1875 TABLET ORAL 2 TIMES DAILY WITH MEALS
COMMUNITY

## 2022-05-25 RX ORDER — LISINOPRIL AND HYDROCHLOROTHIAZIDE 12.5; 1 MG/1; MG/1
1 TABLET ORAL DAILY
Qty: 90 TABLET | Refills: 1 | Status: SHIPPED | OUTPATIENT
Start: 2022-05-25 | End: 2022-06-28 | Stop reason: SDUPTHER

## 2022-05-25 RX ORDER — LISINOPRIL AND HYDROCHLOROTHIAZIDE 12.5; 1 MG/1; MG/1
1 TABLET ORAL DAILY
Qty: 30 TABLET | Refills: 4 | Status: SHIPPED | OUTPATIENT
Start: 2022-05-25 | End: 2022-05-25

## 2022-05-25 NOTE — ASSESSMENT & PLAN NOTE
Hypertension is worsening.  Medication changes per orders.  Blood pressure will be reassessed in 4 weeks   Repeat /104. Will start lisinopril/hctz and pt will keep a diary at home

## 2022-05-25 NOTE — PROGRESS NOTES
"Chief Complaint  Hypertension  Subjective        Talon Manning presents to [facility]  Pt comes in today with c/o elevated BP. Has always ran a little elevated. However, recently noticed that he had been having some increased sweating and feeling hot. Started checking BP at home and running in the 150s/100s. Then a few days ago got a reading of 163/123 and was concerned. Denies any CP, SOA, palpitations, dizziness, or HA's   Has been working out a lot lately and has gained some more muscle mass. Stopped with all pre and post workout supplements.   Not taking phentermine currently.  No longer on mobic.  Denies any excessive alcohol use        Objective     Vital Signs:   BP (!) 154/102   Pulse 84   Temp 97.5 °F (36.4 °C)   Resp 15   Ht 190.5 cm (75\")   Wt 113 kg (250 lb)   SpO2 97%   BMI 31.25 kg/m²       BP Readings from Last 3 Encounters:   05/25/22 (!) 154/102   12/14/21 154/98   05/18/21 122/88       Wt Readings from Last 3 Encounters:   05/25/22 113 kg (250 lb)   12/14/21 114 kg (251 lb)   05/18/21 112 kg (248 lb)     Physical Exam  Constitutional:       Appearance: He is well-developed.   Eyes:      Pupils: Pupils are equal, round, and reactive to light.   Cardiovascular:      Rate and Rhythm: Normal rate and regular rhythm.   Pulmonary:      Effort: Pulmonary effort is normal.      Breath sounds: Normal breath sounds.   Neurological:      Mental Status: He is alert and oriented to person, place, and time.        Result Review :                 Assessment and Plan    Diagnoses and all orders for this visit:    1. Primary hypertension (Primary)  Assessment & Plan:  Hypertension is worsening.  Medication changes per orders.  Blood pressure will be reassessed in 4 weeks   Repeat /104. Will start lisinopril/hctz and pt will keep a diary at home     Orders:  -     lisinopril-hydrochlorothiazide (PRINZIDE,ZESTORETIC) 10-12.5 MG per tablet; Take 1 tablet by mouth " Daily.  Dispense: 30 tablet; Refill: 4  start meds  Follow up in 1 month  Monitor bp and keep diary  During this office visit, we discussed the pertinent aspects of the visit and treatment recommendations. Pt verbalizes understanding. Follow up was discussed. Patient was given the opportunity to ask questions and discuss other concerns.         Follow Up   Return in about 4 weeks (around 6/22/2022) for HTN follow up.  Patient was given instructions and counseling regarding his condition or for health maintenance advice. Please see specific information pulled into the AVS if appropriate.

## 2022-06-28 ENCOUNTER — OFFICE VISIT (OUTPATIENT)
Dept: FAMILY MEDICINE CLINIC | Facility: CLINIC | Age: 42
End: 2022-06-28

## 2022-06-28 VITALS
WEIGHT: 245 LBS | OXYGEN SATURATION: 96 % | HEIGHT: 75 IN | HEART RATE: 68 BPM | TEMPERATURE: 96.8 F | DIASTOLIC BLOOD PRESSURE: 76 MMHG | BODY MASS INDEX: 30.46 KG/M2 | SYSTOLIC BLOOD PRESSURE: 144 MMHG | RESPIRATION RATE: 14 BRPM

## 2022-06-28 DIAGNOSIS — I10 PRIMARY HYPERTENSION: ICD-10-CM

## 2022-06-28 PROCEDURE — 99213 OFFICE O/P EST LOW 20 MIN: CPT | Performed by: NURSE PRACTITIONER

## 2022-06-28 RX ORDER — LISINOPRIL AND HYDROCHLOROTHIAZIDE 12.5; 1 MG/1; MG/1
1 TABLET ORAL DAILY
Qty: 90 TABLET | Refills: 1 | Status: SHIPPED | OUTPATIENT
Start: 2022-06-28 | End: 2023-01-18

## 2022-06-28 NOTE — PROGRESS NOTES
"Chief Complaint  Hypertension  Subjective        Talon Manning presents to [facility]  Pt is coming in today for follow up on BP. At last appt we had started lisinorpil/hctz. Noticed at home BP was starting to come down after 1-2 weeks. Typically running 128/78. Denies any CP, SOA, palpitations, dizziness, or Has. No neg side effects.   Work has been stressful and working long hours, but otherwise doing well.        Objective     Vital Signs:   /76   Pulse 68   Temp 96.8 °F (36 °C)   Resp 14   Ht 190.5 cm (75\")   Wt 111 kg (245 lb)   SpO2 96%   BMI 30.62 kg/m²       BP Readings from Last 3 Encounters:   06/28/22 144/76   05/25/22 (!) 154/102   12/14/21 154/98       Wt Readings from Last 3 Encounters:   06/28/22 111 kg (245 lb)   05/25/22 113 kg (250 lb)   12/14/21 114 kg (251 lb)     Physical Exam  Constitutional:       Appearance: He is well-developed.   Eyes:      Pupils: Pupils are equal, round, and reactive to light.   Cardiovascular:      Rate and Rhythm: Normal rate and regular rhythm.   Pulmonary:      Effort: Pulmonary effort is normal.      Breath sounds: Normal breath sounds.   Neurological:      Mental Status: He is alert and oriented to person, place, and time.        Result Review :                 Assessment and Plan    Diagnoses and all orders for this visit:    1. Primary hypertension  Assessment & Plan:  Hypertension is improving with treatment.  Continue current treatment regimen.  Blood pressure will be reassessed at the next regular appointment.  Repeat /90. Reviewed home readings and running 128/78. Pt feels better and we will cont same dose and he will cont to monitor BP at home and keep diary.     Orders:  -     lisinopril-hydrochlorothiazide (PRINZIDE,ZESTORETIC) 10-12.5 MG per tablet; Take 1 tablet by mouth Daily.  Dispense: 90 tablet; Refill: 1    During this office visit, we discussed the pertinent aspects of the visit and treatment " recommendations. Pt verbalizes understanding. Follow up was discussed. Patient was given the opportunity to ask questions and discuss other concerns.         Follow Up   Return in about 5 months (around 11/28/2022) for Annual physical.  Patient was given instructions and counseling regarding his condition or for health maintenance advice. Please see specific information pulled into the AVS if appropriate.

## 2022-06-28 NOTE — ASSESSMENT & PLAN NOTE
Hypertension is improving with treatment.  Continue current treatment regimen.  Blood pressure will be reassessed at the next regular appointment.  Repeat /90. Reviewed home readings and running 128/78. Pt feels better and we will cont same dose and he will cont to monitor BP at home and keep diary.

## 2022-07-26 ENCOUNTER — TELEPHONE (OUTPATIENT)
Dept: FAMILY MEDICINE CLINIC | Facility: CLINIC | Age: 42
End: 2022-07-26

## 2022-07-26 NOTE — TELEPHONE ENCOUNTER
Caller: Talon Manning    Relationship to patient: Self    Best call back number: [phone]    Patient is needing: PATIENT CALLED AND SAID HE TRIED TO REFILL HIS CIALIS PRESCRIPTION AND THE PHARMACY SAID THEY CAN'T FILL THIS BECAUSE THEIR IS AN ISSUE WITH HIS INSURANCE AND THE DOSAGE     HE WAS NOT SURE WHAT THE ISSUE WAS WITH HIS INSURANCE BUT HE IS GOING TO CHECK WITH HIS INSURANCE COMPANY AND SEE WHAT THE ISSUE IS

## 2022-07-27 NOTE — TELEPHONE ENCOUNTER
He has a PA in effect until  03/07/2023   So he needs to recontact the pharmacy   Or his ins company

## 2022-08-09 RX ORDER — TADALAFIL 20 MG/1
20 TABLET ORAL DAILY PRN
Qty: 30 TABLET | Refills: 1 | Status: SHIPPED | OUTPATIENT
Start: 2022-08-09 | End: 2022-12-21 | Stop reason: SDUPTHER

## 2022-08-09 NOTE — TELEPHONE ENCOUNTER
Caller: Nigel Talon DWIGHT    Relationship: Self    Best call back number: [phone]    Requested Prescriptions:   Requested Prescriptions     Pending Prescriptions Disp Refills   • tadalafil (CIALIS) 20 MG tablet 30 tablet 2     Sig: Take 1 tablet by mouth Daily As Needed for Erectile Dysfunction.        Pharmacy where request should be sent: [facility] - [address] [phone] [facility] [phone]      Additional details provided by patient: [facility] WAS ONLY ABLE TO FILL 8 PILLS AND TOLD PATIENT IF IT WAS LONG TERM HE WOULD NEED TO TRANSFER TO [facility].     REQUESTING LONG TERM CIALIS SENT TO [facility]    Does the patient have less than a 3 day supply:  [] Yes  [x] No    Mayco De Anda Rep   08/09/22 13:17 EDT

## 2022-08-23 DIAGNOSIS — Z00.00 PREVENTATIVE HEALTH CARE: ICD-10-CM

## 2022-08-23 RX ORDER — PHENTERMINE HYDROCHLORIDE 37.5 MG/1
37.5 CAPSULE ORAL EVERY MORNING
Qty: 30 CAPSULE | Refills: 0 | OUTPATIENT
Start: 2022-08-23

## 2022-08-24 ENCOUNTER — ON CAMPUS - OUTPATIENT (AMBULATORY)
Dept: URBAN - METROPOLITAN AREA HOSPITAL 2 | Facility: HOSPITAL | Age: 42
End: 2022-08-24
Payer: COMMERCIAL

## 2022-08-24 ENCOUNTER — OFFICE (AMBULATORY)
Dept: URBAN - METROPOLITAN AREA PATHOLOGY 4 | Facility: PATHOLOGY | Age: 42
End: 2022-08-24
Payer: COMMERCIAL

## 2022-08-24 VITALS
SYSTOLIC BLOOD PRESSURE: 157 MMHG | HEIGHT: 75 IN | DIASTOLIC BLOOD PRESSURE: 82 MMHG | TEMPERATURE: 98 F | SYSTOLIC BLOOD PRESSURE: 108 MMHG | RESPIRATION RATE: 19 BRPM | WEIGHT: 241 LBS | OXYGEN SATURATION: 99 % | HEART RATE: 83 BPM | HEART RATE: 78 BPM | SYSTOLIC BLOOD PRESSURE: 104 MMHG | SYSTOLIC BLOOD PRESSURE: 130 MMHG | HEART RATE: 86 BPM | RESPIRATION RATE: 16 BRPM | DIASTOLIC BLOOD PRESSURE: 80 MMHG | DIASTOLIC BLOOD PRESSURE: 85 MMHG | DIASTOLIC BLOOD PRESSURE: 68 MMHG | HEART RATE: 80 BPM | SYSTOLIC BLOOD PRESSURE: 124 MMHG | OXYGEN SATURATION: 97 % | DIASTOLIC BLOOD PRESSURE: 114 MMHG | OXYGEN SATURATION: 100 % | SYSTOLIC BLOOD PRESSURE: 138 MMHG | RESPIRATION RATE: 17 BRPM | SYSTOLIC BLOOD PRESSURE: 107 MMHG | RESPIRATION RATE: 18 BRPM | DIASTOLIC BLOOD PRESSURE: 71 MMHG | HEART RATE: 84 BPM | DIASTOLIC BLOOD PRESSURE: 69 MMHG | DIASTOLIC BLOOD PRESSURE: 77 MMHG | HEART RATE: 88 BPM | OXYGEN SATURATION: 98 % | HEART RATE: 89 BPM

## 2022-08-24 DIAGNOSIS — Z86.010 PERSONAL HISTORY OF COLONIC POLYPS: ICD-10-CM

## 2022-08-24 DIAGNOSIS — K63.5 POLYP OF COLON: ICD-10-CM

## 2022-08-24 DIAGNOSIS — D12.3 BENIGN NEOPLASM OF TRANSVERSE COLON: ICD-10-CM

## 2022-08-24 PROCEDURE — 88305 TISSUE EXAM BY PATHOLOGIST: CPT | Performed by: INTERNAL MEDICINE

## 2022-08-24 PROCEDURE — 45385 COLONOSCOPY W/LESION REMOVAL: CPT | Mod: 33 | Performed by: INTERNAL MEDICINE

## 2022-12-21 ENCOUNTER — LAB (OUTPATIENT)
Dept: FAMILY MEDICINE CLINIC | Facility: CLINIC | Age: 42
End: 2022-12-21

## 2022-12-21 ENCOUNTER — OFFICE VISIT (OUTPATIENT)
Dept: FAMILY MEDICINE CLINIC | Facility: CLINIC | Age: 42
End: 2022-12-21

## 2022-12-21 VITALS
BODY MASS INDEX: 31.68 KG/M2 | TEMPERATURE: 98 F | DIASTOLIC BLOOD PRESSURE: 90 MMHG | SYSTOLIC BLOOD PRESSURE: 144 MMHG | HEIGHT: 75 IN | OXYGEN SATURATION: 98 % | RESPIRATION RATE: 16 BRPM | WEIGHT: 254.8 LBS | HEART RATE: 86 BPM

## 2022-12-21 DIAGNOSIS — I10 PRIMARY HYPERTENSION: ICD-10-CM

## 2022-12-21 DIAGNOSIS — Z00.00 PREVENTATIVE HEALTH CARE: Primary | ICD-10-CM

## 2022-12-21 DIAGNOSIS — Z00.00 PREVENTATIVE HEALTH CARE: ICD-10-CM

## 2022-12-21 DIAGNOSIS — Z11.59 NEED FOR HEPATITIS C SCREENING TEST: ICD-10-CM

## 2022-12-21 LAB
ALBUMIN SERPL-MCNC: 4.4 G/DL (ref 3.5–5.2)
ALBUMIN/GLOB SERPL: 1.6 G/DL
ALP SERPL-CCNC: 54 U/L (ref 39–117)
ALT SERPL W P-5'-P-CCNC: 44 U/L (ref 1–41)
ANION GAP SERPL CALCULATED.3IONS-SCNC: 11 MMOL/L (ref 5–15)
AST SERPL-CCNC: 27 U/L (ref 1–40)
BASOPHILS # BLD AUTO: 0.03 10*3/MM3 (ref 0–0.2)
BASOPHILS NFR BLD AUTO: 0.5 % (ref 0–1.5)
BILIRUB SERPL-MCNC: 0.3 MG/DL (ref 0–1.2)
BUN SERPL-MCNC: 16 MG/DL (ref 6–20)
BUN/CREAT SERPL: 16.7 (ref 7–25)
CALCIUM SPEC-SCNC: 9.4 MG/DL (ref 8.6–10.5)
CHLORIDE SERPL-SCNC: 102 MMOL/L (ref 98–107)
CHOLEST SERPL-MCNC: 197 MG/DL (ref 0–200)
CO2 SERPL-SCNC: 26 MMOL/L (ref 22–29)
CREAT SERPL-MCNC: 0.96 MG/DL (ref 0.76–1.27)
DEPRECATED RDW RBC AUTO: 39.1 FL (ref 37–54)
EGFRCR SERPLBLD CKD-EPI 2021: 101.8 ML/MIN/1.73
EOSINOPHIL # BLD AUTO: 0.12 10*3/MM3 (ref 0–0.4)
EOSINOPHIL NFR BLD AUTO: 1.9 % (ref 0.3–6.2)
ERYTHROCYTE [DISTWIDTH] IN BLOOD BY AUTOMATED COUNT: 12.2 % (ref 12.3–15.4)
GLOBULIN UR ELPH-MCNC: 2.7 GM/DL
GLUCOSE SERPL-MCNC: 102 MG/DL (ref 65–99)
HBA1C MFR BLD: 5.2 % (ref 3.5–5.6)
HCT VFR BLD AUTO: 41.2 % (ref 37.5–51)
HCV AB SER DONR QL: NORMAL
HDLC SERPL-MCNC: 49 MG/DL (ref 40–60)
HGB BLD-MCNC: 14.3 G/DL (ref 13–17.7)
IMM GRANULOCYTES # BLD AUTO: 0.03 10*3/MM3 (ref 0–0.05)
IMM GRANULOCYTES NFR BLD AUTO: 0.5 % (ref 0–0.5)
LDLC SERPL CALC-MCNC: 122 MG/DL (ref 0–100)
LDLC/HDLC SERPL: 2.42 {RATIO}
LYMPHOCYTES # BLD AUTO: 1.63 10*3/MM3 (ref 0.7–3.1)
LYMPHOCYTES NFR BLD AUTO: 25.2 % (ref 19.6–45.3)
MCH RBC QN AUTO: 30 PG (ref 26.6–33)
MCHC RBC AUTO-ENTMCNC: 34.7 G/DL (ref 31.5–35.7)
MCV RBC AUTO: 86.4 FL (ref 79–97)
MONOCYTES # BLD AUTO: 0.46 10*3/MM3 (ref 0.1–0.9)
MONOCYTES NFR BLD AUTO: 7.1 % (ref 5–12)
NEUTROPHILS NFR BLD AUTO: 4.2 10*3/MM3 (ref 1.7–7)
NEUTROPHILS NFR BLD AUTO: 64.8 % (ref 42.7–76)
NRBC BLD AUTO-RTO: 0 /100 WBC (ref 0–0.2)
PLATELET # BLD AUTO: 289 10*3/MM3 (ref 140–450)
PMV BLD AUTO: 10 FL (ref 6–12)
POTASSIUM SERPL-SCNC: 4.1 MMOL/L (ref 3.5–5.2)
PROT SERPL-MCNC: 7.1 G/DL (ref 6–8.5)
RBC # BLD AUTO: 4.77 10*6/MM3 (ref 4.14–5.8)
SODIUM SERPL-SCNC: 139 MMOL/L (ref 136–145)
TRIGL SERPL-MCNC: 146 MG/DL (ref 0–150)
TSH SERPL DL<=0.05 MIU/L-ACNC: 0.93 UIU/ML (ref 0.27–4.2)
VLDLC SERPL-MCNC: 26 MG/DL (ref 5–40)
WBC NRBC COR # BLD: 6.47 10*3/MM3 (ref 3.4–10.8)

## 2022-12-21 PROCEDURE — 99213 OFFICE O/P EST LOW 20 MIN: CPT | Performed by: NURSE PRACTITIONER

## 2022-12-21 PROCEDURE — 86800 THYROGLOBULIN ANTIBODY: CPT | Performed by: NURSE PRACTITIONER

## 2022-12-21 PROCEDURE — 36415 COLL VENOUS BLD VENIPUNCTURE: CPT

## 2022-12-21 PROCEDURE — 86803 HEPATITIS C AB TEST: CPT | Performed by: NURSE PRACTITIONER

## 2022-12-21 PROCEDURE — 86376 MICROSOMAL ANTIBODY EACH: CPT | Performed by: NURSE PRACTITIONER

## 2022-12-21 PROCEDURE — 80061 LIPID PANEL: CPT | Performed by: NURSE PRACTITIONER

## 2022-12-21 PROCEDURE — 99396 PREV VISIT EST AGE 40-64: CPT | Performed by: NURSE PRACTITIONER

## 2022-12-21 PROCEDURE — 80050 GENERAL HEALTH PANEL: CPT | Performed by: NURSE PRACTITIONER

## 2022-12-21 PROCEDURE — 83036 HEMOGLOBIN GLYCOSYLATED A1C: CPT | Performed by: NURSE PRACTITIONER

## 2022-12-21 RX ORDER — AMLODIPINE BESYLATE 2.5 MG/1
2.5 TABLET ORAL DAILY
Qty: 30 TABLET | Refills: 5 | Status: SHIPPED | OUTPATIENT
Start: 2022-12-21 | End: 2023-06-19

## 2022-12-21 RX ORDER — TADALAFIL 20 MG/1
20 TABLET ORAL DAILY PRN
Qty: 30 TABLET | Refills: 1 | Status: SHIPPED | OUTPATIENT
Start: 2022-12-21

## 2022-12-21 NOTE — PROGRESS NOTES
"Chief Complaint  Annual Exam  Subjective        Talon Manning presents to [facility]  History of Present Illness  Pt comes in today for routine physical and f/u on HTN.   Currently taking lisinopril/hctz. At home BP averages 133/88. Highest 145/95.  Denies any CP, SOA, palpitations, dizziness, or HA's.        Objective     Vital Signs:   /90   Pulse 86   Temp 98 °F (36.7 °C)   Resp 16   Ht 190.5 cm (75\")   Wt 116 kg (254 lb 12.8 oz)   SpO2 98%   BMI 31.85 kg/m²       BP Readings from Last 3 Encounters:   12/21/22 144/90   06/28/22 144/76   05/25/22 (!) 154/102       Wt Readings from Last 3 Encounters:   12/21/22 116 kg (254 lb 12.8 oz)   06/28/22 111 kg (245 lb)   05/25/22 113 kg (250 lb)     Physical Exam  Constitutional:       Appearance: He is well-developed.   HENT:      Head: Normocephalic.   Eyes:      Conjunctiva/sclera: Conjunctivae normal.      Pupils: Pupils are equal, round, and reactive to light.   Neck:      Thyroid: No thyromegaly.   Cardiovascular:      Rate and Rhythm: Normal rate and regular rhythm.      Heart sounds: No murmur heard.  Pulmonary:      Effort: Pulmonary effort is normal.      Breath sounds: Normal breath sounds.   Abdominal:      General: Bowel sounds are normal.      Palpations: Abdomen is soft.      Tenderness: There is no abdominal tenderness.   Musculoskeletal:         General: Normal range of motion.      Cervical back: Normal range of motion and neck supple.   Skin:     General: Skin is warm and dry.      Findings: No lesion.   Neurological:      Mental Status: He is alert and oriented to person, place, and time.   Psychiatric:         Behavior: Behavior normal.        Result Review :                 Assessment and Plan    Diagnoses and all orders for this visit:    1. Preventative health care (Primary)  -     Hepatitis C Antibody; Future  -     CBC & Differential; Future  -     Comprehensive Metabolic Panel; Future  -     " Lipid Panel; Future  -     Hemoglobin A1c; Future  -     TSH; Future  -     Thyroid Antibodies; Future    2. Need for hepatitis C screening test  -     Hepatitis C Antibody; Future    3. Primary hypertension  -     amLODIPine (NORVASC) 2.5 MG tablet; Take 1 tablet by mouth Daily for 180 days.  Dispense: 30 tablet; Refill: 5    Other orders  -     tadalafil (CIALIS) 20 MG tablet; Take 1 tablet by mouth Daily As Needed for Erectile Dysfunction.  Dispense: 30 tablet; Refill: 1    check labs  Repeat /88 Add norvasc  Follow up in 3 months  Discussed importance of regular exercise and recommended starting or continuing a regular exercise program for good health. The patient was also encouraged to lose weight for better health.   During this visit for their annual exam, we reviewed their personal history, social history and family history. We went over their medications and all the recommended health maintenance items for their age group. They were given the opportunity to ask questions and discuss other concerns.           Follow Up   Return in about 3 months (around 3/21/2023) for HTN follow up.  Patient was given instructions and counseling regarding his condition or for health maintenance advice. Please see specific information pulled into the AVS if appropriate.

## 2022-12-22 LAB
THYROGLOB AB SERPL-ACNC: <1 IU/ML (ref 0–0.9)
THYROPEROXIDASE AB SERPL-ACNC: 12 IU/ML (ref 0–34)

## 2023-01-17 DIAGNOSIS — I10 PRIMARY HYPERTENSION: ICD-10-CM

## 2023-01-18 RX ORDER — LISINOPRIL AND HYDROCHLOROTHIAZIDE 12.5; 1 MG/1; MG/1
TABLET ORAL
Qty: 90 TABLET | Refills: 3 | Status: SHIPPED | OUTPATIENT
Start: 2023-01-18

## 2023-03-13 ENCOUNTER — OFFICE VISIT (OUTPATIENT)
Dept: FAMILY MEDICINE CLINIC | Facility: CLINIC | Age: 43
End: 2023-03-13
Payer: COMMERCIAL

## 2023-03-13 VITALS
DIASTOLIC BLOOD PRESSURE: 82 MMHG | OXYGEN SATURATION: 98 % | SYSTOLIC BLOOD PRESSURE: 126 MMHG | HEART RATE: 73 BPM | TEMPERATURE: 96.6 F | WEIGHT: 247 LBS | HEIGHT: 75 IN | RESPIRATION RATE: 15 BRPM | BODY MASS INDEX: 30.71 KG/M2

## 2023-03-13 DIAGNOSIS — I10 PRIMARY HYPERTENSION: Primary | ICD-10-CM

## 2023-03-13 DIAGNOSIS — E66.9 CLASS 1 OBESITY WITH SERIOUS COMORBIDITY AND BODY MASS INDEX (BMI) OF 30.0 TO 30.9 IN ADULT, UNSPECIFIED OBESITY TYPE: ICD-10-CM

## 2023-03-13 PROCEDURE — 99213 OFFICE O/P EST LOW 20 MIN: CPT | Performed by: NURSE PRACTITIONER

## 2023-03-13 NOTE — PROGRESS NOTES
"Chief Complaint  Hypertension and Hyperlipidemia  Subjective        Talon Manning presents to [facility]  History of Present Illness  Pt comes in today for follow up on BP. At last appt we had started amlodipine 2.5mg daily. BP has been doing better. Denies any CP, SOA, palpitations, dizziness, or HA's.   He is interested in trying something for weight loss. We had discussed phentermine not being a good option 2/2 HTN. However, he would be interested in something else such as wegovy. Will check on insurance coverage.        Objective     Vital Signs:   /82   Pulse 73   Temp 96.6 °F (35.9 °C)   Resp 15   Ht 190.5 cm (75\")   Wt 112 kg (247 lb)   SpO2 98%   BMI 30.87 kg/m²       BP Readings from Last 3 Encounters:   03/13/23 126/82   12/21/22 144/90   06/28/22 144/76       Wt Readings from Last 3 Encounters:   03/13/23 112 kg (247 lb)   12/21/22 116 kg (254 lb 12.8 oz)   06/28/22 111 kg (245 lb)     Physical Exam  Constitutional:       Appearance: He is well-developed.   Eyes:      Pupils: Pupils are equal, round, and reactive to light.   Cardiovascular:      Rate and Rhythm: Normal rate and regular rhythm.   Pulmonary:      Effort: Pulmonary effort is normal.      Breath sounds: Normal breath sounds.   Neurological:      Mental Status: He is alert and oriented to person, place, and time.        Result Review :                 Assessment and Plan    Diagnoses and all orders for this visit:    1. Primary hypertension (Primary)  Assessment & Plan:  Hypertension is improving with treatment.  Continue current treatment regimen.  Blood pressure will be reassessed at the next regular appointment.      2. Class 1 obesity with serious comorbidity and body mass index (BMI) of 30.0 to 30.9 in adult, unspecified obesity type  -     Semaglutide-Weight Management 0.25 MG/0.5ML solution auto-injector; Inject 0.25 mg under the skin into the appropriate area as directed 1 (One) Time " Per Week.  Dispense: 0.5 mL; Refill: 1  cont same regimen  Will try for wegovy  During this office visit, we discussed the pertinent aspects of the visit and treatment recommendations. Pt verbalizes understanding. Follow up was discussed. Patient was given the opportunity to ask questions and discuss other concerns.   Discussed importance of regular exercise and recommended starting or continuing a regular exercise program for good health. The patient was also encouraged to lose weight for better health.         Follow Up   Return in about 6 months (around 9/13/2023) for HTN follow up.  Patient was given instructions and counseling regarding his condition or for health maintenance advice. Please see specific information pulled into the AVS if appropriate.

## 2023-03-15 ENCOUNTER — TELEPHONE (OUTPATIENT)
Dept: FAMILY MEDICINE CLINIC | Facility: CLINIC | Age: 43
End: 2023-03-15
Payer: COMMERCIAL

## 2023-03-15 NOTE — TELEPHONE ENCOUNTER
Patient saw [facility] on Monday and she prescribed Semaglutide for weight loss.  It is needing a letter of medical necessity.  Can this be started even tho [facility] is out til 3/27?

## 2023-03-15 NOTE — TELEPHONE ENCOUNTER
"    Caller: Talon Manning \"PJ\"    Relationship to patient: Self    Best call back number: [phone]    Patient is needing: PATIENT IS ASKING FOR PROVIDER  TO CALL THE PHARMACY AND FIND OUT THE ISSUE AND IF ITS NOT COVERS BY INSURANCE  WHAT DO WE DO NEXT          "

## 2023-03-16 RX ORDER — SEMAGLUTIDE 0.25 MG/.5ML
0.25 INJECTION, SOLUTION SUBCUTANEOUS WEEKLY
Qty: 2 ML | Refills: 1 | Status: SHIPPED | OUTPATIENT
Start: 2023-03-16

## 2023-03-21 ENCOUNTER — TELEPHONE (OUTPATIENT)
Dept: FAMILY MEDICINE CLINIC | Facility: CLINIC | Age: 43
End: 2023-03-21
Payer: COMMERCIAL

## 2023-03-21 NOTE — TELEPHONE ENCOUNTER
PATIENT CALLED STATED [facility] WILL NEED A LETTER OF MEDICAL NECESSITY AND A PRIOR AUTHORIZATION FOR THE WEGOOVY, THIS IS HOPEFULLY TO ADJUST THE COST OF THIS MEDICATION. TO AN AFFORDABLE PRICE.    PATIENT STATES [facility] GAVE HIM THIS NUMBER TO CALL AND DISCUSS THE NEEDS TO GET THIS MEDICATION APPROVED AFFORDABLY.    [facility]  [phone]    CALL BACK FOR PATIENT [phone]

## 2023-03-21 NOTE — TELEPHONE ENCOUNTER
I explained to the patient that Anais was not in the office this week and he understood and he is willing to wait and I told him I would discuss this with Anais next week when she gets back in the office some other options.

## 2023-03-27 NOTE — TELEPHONE ENCOUNTER
Patient wants to know if he can try to see if there is a cheaper medication that he take instead of that one.

## 2023-03-30 RX ORDER — SIMVASTATIN 40 MG
TABLET ORAL
Qty: 90 TABLET | Refills: 3 | Status: SHIPPED | OUTPATIENT
Start: 2023-03-30

## 2023-04-12 ENCOUNTER — TELEPHONE (OUTPATIENT)
Dept: FAMILY MEDICINE CLINIC | Facility: CLINIC | Age: 43
End: 2023-04-12
Payer: COMMERCIAL

## 2023-04-12 NOTE — TELEPHONE ENCOUNTER
Caller: SAPNA BISHOP    Relationship to patient: PT'S WIFE    Best call back number: [phone]     Patient is needing: SEE PREVIOUS SIGNED ENCOUNTER. PT'S WIFE STATED THAT SAXENDA (WITHOUT PA) IS ESTIMATED AT THE SAME PRICE AS WEGOVY. SAPNA WANTS TO KNOW IF A PA CAN BE SUBMITTED FOR SAXENDA SO THEY CAN SEE THE ACTUAL BAXTER. IF THE TWO MEDICATIONS ARE THE SAME PRICE PT WOULD LIKE TO STAY ON THE WEGOVY, BUT WANTS TO KNOW IF IT CAN BE PRESCRIBED AT A HIGHER DOSE SO HE CAN POSSIBLY TAKE IT A SHORTER AMOUNT OF TIME. PLEASE ADVISE.

## 2023-04-12 NOTE — TELEPHONE ENCOUNTER
I called patient's wife back and she is going to call the insurance company back to see if she can get an answer from them to see what they say about the medication and call me back.

## 2023-04-12 NOTE — TELEPHONE ENCOUNTER
Caller: LUIS CARLOS BREEDING    Relationship: Spouse    Best call back number: [phone]    Which medication are you concerned about: Wegovy 0.25 MG/0.5ML solution auto-injector    What are your concerns: HAS TAKEN 6 WEEKS TO GET PRECERT AND PJ ENDED UP PAYING $1400 OUT OF POCKET.    WIFE HAD MENTIONED A SPECIALIST THAT MAY BE ABLE TO TAKE OVER CARE . LUIS CARLOS WOULD LIKE A CALL TO DISCUSS OTHER OPTIONS SO THAT PJ CAN BE ON MEDICATION WITHOUT LAPSE AND AT A CHEAPER COST

## 2023-04-12 NOTE — TELEPHONE ENCOUNTER
Called patient back and asked him to call his insurance to see if the Saxenda would be cheaper or if it would be better for him to see a bariatric doctor. Usually if you see a specialist then they can get it approved cheaper

## 2023-04-12 NOTE — TELEPHONE ENCOUNTER
Dayanara, They can call their insurance and ask how much the copay would be if approved. We dont have to prescribe and then wait for denial and send out PA and wait again for insurance to make a decision.  They can call them and get prices on anything on their policy.    They can also see what is covered as far as weight loss

## 2023-04-13 ENCOUNTER — TELEPHONE (OUTPATIENT)
Dept: FAMILY MEDICINE CLINIC | Facility: CLINIC | Age: 43
End: 2023-04-13
Payer: COMMERCIAL

## 2023-04-13 NOTE — TELEPHONE ENCOUNTER
"Caller: Talon Manning \"TATIANA\" (LUIS CARLOS)     Relationship to patient: Self    Best call back number:[phone]    Patient is needing:     WIFE SPOKE TO INSURANCE COMPANY AND THEY GAVE HER 4 MEDICATIONS THAT WOULD BE COVERED IF THEY WOULD BE A GOOD SUBSTITUTE.    1. OZEMPIC    2. TRULICITY    3. BYETTA    4. BYDUREON    IF ANY OF THESE WOULD BE ACCEPTABLE, IT WOULD BE COVERED BY HIS PLAN, HOWEVER IT WOULD REQUIRE A PRIOR AUTHORIZATION.      PREFERRED PHARMACY IS:     (WOULD NEED TO BE 90 DAYS)    [facility] - [address] - [phone]  - [phone]   [phone]        "

## 2023-04-17 RX ORDER — SEMAGLUTIDE 0.25 MG/.5ML
0.25 INJECTION, SOLUTION SUBCUTANEOUS WEEKLY
Qty: 2 ML | Refills: 1 | Status: SHIPPED | OUTPATIENT
Start: 2023-04-17

## 2023-06-05 RX ORDER — ALLOPURINOL 300 MG/1
TABLET ORAL
Qty: 90 TABLET | Refills: 3 | Status: SHIPPED | OUTPATIENT
Start: 2023-06-05

## 2023-08-19 DIAGNOSIS — E66.9 CLASS 1 OBESITY WITH SERIOUS COMORBIDITY AND BODY MASS INDEX (BMI) OF 30.0 TO 30.9 IN ADULT, UNSPECIFIED OBESITY TYPE: ICD-10-CM

## 2023-08-21 RX ORDER — PHENTERMINE HYDROCHLORIDE 37.5 MG/1
37.5 CAPSULE ORAL EVERY MORNING
Qty: 30 CAPSULE | Refills: 0 | Status: SHIPPED | OUTPATIENT
Start: 2023-08-21

## 2023-09-18 ENCOUNTER — OFFICE VISIT (OUTPATIENT)
Dept: FAMILY MEDICINE CLINIC | Facility: CLINIC | Age: 43
End: 2023-09-18
Payer: COMMERCIAL

## 2023-09-18 VITALS
DIASTOLIC BLOOD PRESSURE: 86 MMHG | TEMPERATURE: 98 F | HEIGHT: 75 IN | RESPIRATION RATE: 15 BRPM | BODY MASS INDEX: 30.21 KG/M2 | HEART RATE: 67 BPM | WEIGHT: 243 LBS | SYSTOLIC BLOOD PRESSURE: 131 MMHG | OXYGEN SATURATION: 99 %

## 2023-09-18 DIAGNOSIS — E66.9 CLASS 1 OBESITY WITH SERIOUS COMORBIDITY AND BODY MASS INDEX (BMI) OF 30.0 TO 30.9 IN ADULT, UNSPECIFIED OBESITY TYPE: Primary | ICD-10-CM

## 2023-09-18 PROCEDURE — 99213 OFFICE O/P EST LOW 20 MIN: CPT | Performed by: NURSE PRACTITIONER

## 2023-09-18 RX ORDER — SEMAGLUTIDE 0.25 MG/.5ML
INJECTION, SOLUTION SUBCUTANEOUS
COMMUNITY
End: 2023-09-18

## 2023-09-18 NOTE — PROGRESS NOTES
"Chief Complaint  Weight Check and Hypertension  Subjective        Talon Manning presents to [facility]  History of Present Illness  Pt comes in today for 3 month follow up on weight loss and BP. At last appt we had started phentermine. Took it for 90 days. Didn't see great results.  Prior to that took wegovy x 3 months and didn't see results with that either. Also he was paying $1800/month.   Has been trying to do better with diet. Has tried to eliminate processed foods.  He currently working out 4 days/week. Will get 20 min cardio, and 1-1.5 hours of weight training.   Interested in trying compounded version of wegovy.   Hypertension       Objective     Vital Signs:   /86   Pulse 67   Temp 98 °F (36.7 °C)   Resp 15   Ht 190.5 cm (75\")   Wt 110 kg (243 lb)   SpO2 99%   BMI 30.37 kg/m²       BP Readings from Last 3 Encounters:   09/18/23 131/86   06/26/23 116/76   03/13/23 126/82       Wt Readings from Last 3 Encounters:   09/18/23 110 kg (243 lb)   06/26/23 107 kg (236 lb)   03/13/23 112 kg (247 lb)     Physical Exam  Constitutional:       Appearance: He is well-developed.   Eyes:      Pupils: Pupils are equal, round, and reactive to light.   Cardiovascular:      Rate and Rhythm: Normal rate and regular rhythm.   Pulmonary:      Effort: Pulmonary effort is normal.      Breath sounds: Normal breath sounds.   Neurological:      Mental Status: He is alert and oriented to person, place, and time.      Result Review :                 Assessment and Plan    Diagnoses and all orders for this visit:    1. Class 1 obesity with serious comorbidity and body mass index (BMI) of 30.0 to 30.9 in adult, unspecified obesity type (Primary)  -     Semaglutide-Weight Management 0.25 MG/0.5ML solution auto-injector; Inject 0.25 mg under the skin into the appropriate area as directed 1 (One) Time Per Week.  Dispense: 1 mL; Refill: 1    Start semaglutide. Will titrate monthly  During " this office visit, we discussed the pertinent aspects of the visit and treatment recommendations. Pt verbalizes understanding. Follow up was discussed. Patient was given the opportunity to ask questions and discuss other concerns.   Discussed importance of regular exercise and recommended starting or continuing a regular exercise program for good health. The patient was also encouraged to lose weight for better health.         Follow Up   Return in about 3 months (around 12/18/2023) for Annual physical.  Patient was given instructions and counseling regarding his condition or for health maintenance advice. Please see specific information pulled into the AVS if appropriate.

## 2023-10-22 RX ORDER — ESOMEPRAZOLE MAGNESIUM 40 MG/1
CAPSULE, DELAYED RELEASE ORAL
Qty: 90 CAPSULE | Refills: 3 | Status: SHIPPED | OUTPATIENT
Start: 2023-10-22

## 2023-11-08 ENCOUNTER — TELEPHONE (OUTPATIENT)
Dept: FAMILY MEDICINE CLINIC | Facility: CLINIC | Age: 43
End: 2023-11-08
Payer: COMMERCIAL

## 2023-11-08 DIAGNOSIS — E66.9 CLASS 1 OBESITY WITH SERIOUS COMORBIDITY AND BODY MASS INDEX (BMI) OF 30.0 TO 30.9 IN ADULT, UNSPECIFIED OBESITY TYPE: ICD-10-CM

## 2023-11-08 RX ORDER — SEMAGLUTIDE 0.5 MG/.5ML
0.5 INJECTION, SOLUTION SUBCUTANEOUS WEEKLY
Qty: 2 ML | Refills: 1 | Status: SHIPPED | OUTPATIENT
Start: 2023-11-08

## 2023-11-08 NOTE — TELEPHONE ENCOUNTER
"Caller: Talon Manning \"PJ\"    Relationship: Self    Best call back number: [phone]    Requested Prescriptions:   INCREASE OF Semaglutide-Weight Management 0.25 MG/0.5ML solution auto-injector     Pharmacy where request should be sent: [facility] - [place], [place] -[phone] [facility], [place] - [address] [facility] [phone] [facility] [phone]      Last office visit with prescribing clinician: 9/18/2023   Last telemedicine visit with prescribing clinician: Visit date not found   Next office visit with prescribing clinician: Visit date not found     Does the patient have less than a 3 day supply:  [x] Yes  [] No    Mayco De Anda Rep   11/08/23 11:04 EST           "

## 2023-12-15 RX ORDER — SEMAGLUTIDE 0.5 MG/.5ML
0.5 INJECTION, SOLUTION SUBCUTANEOUS WEEKLY
Qty: 2 ML | Refills: 1 | Status: SHIPPED | OUTPATIENT
Start: 2023-12-15

## 2023-12-15 NOTE — TELEPHONE ENCOUNTER
Caller: RODOLFO    Relationship: Spouse    Best call back number: [phone]     Requested Prescriptions:   Requested Prescriptions     Pending Prescriptions Disp Refills    Semaglutide-Weight Management (Wegovy) 0.5 MG/0.5ML solution auto-injector 2 mL 1     Sig: Inject 0.5 mL under the skin into the appropriate area as directed 1 (One) Time Per Week. Compounded with [facility]        Pharmacy where request should be sent: [place], [place] -[phone] [facility] [address] [facility] [phone] [place] [phone]      Last office visit with prescribing clinician: 9/18/2023   Last telemedicine visit with prescribing clinician: Visit date not found   Next office visit with prescribing clinician: Visit date not found     Additional details provided by patient: DOSAGE INCREASE     Does the patient have less than a 3 day supply:  [x] Yes  [] No    Would you like a call back once the refill request has been completed: [] Yes [] No    If the office needs to give you a call back, can they leave a voicemail: [] Yes [] No    Joseph Calle   12/15/23 14:48 EST

## 2024-01-20 DIAGNOSIS — I10 PRIMARY HYPERTENSION: ICD-10-CM

## 2024-01-21 RX ORDER — AMLODIPINE BESYLATE 2.5 MG/1
TABLET ORAL
Qty: 90 TABLET | Refills: 3 | Status: SHIPPED | OUTPATIENT
Start: 2024-01-21

## 2024-01-25 DIAGNOSIS — I10 PRIMARY HYPERTENSION: ICD-10-CM

## 2024-01-25 RX ORDER — LISINOPRIL AND HYDROCHLOROTHIAZIDE 12.5; 1 MG/1; MG/1
1 TABLET ORAL DAILY
Qty: 90 TABLET | Refills: 3 | Status: SHIPPED | OUTPATIENT
Start: 2024-01-25

## 2024-02-07 RX ORDER — SEMAGLUTIDE 0.5 MG/.5ML
0.5 INJECTION, SOLUTION SUBCUTANEOUS WEEKLY
Qty: 2 ML | Refills: 1 | Status: CANCELLED | OUTPATIENT
Start: 2024-02-07

## 2024-02-07 NOTE — TELEPHONE ENCOUNTER
"Caller: Talon Manning DWIGHT \"PJ\"    Relationship: Self    Best call back number:     [phone]       Requested Prescriptions:   Requested Prescriptions     Pending Prescriptions Disp Refills    Semaglutide-Weight Management (Wegovy) 0.5 MG/0.5ML solution auto-injector 2 mL 1     Sig: Inject 0.5 mL under the skin into the appropriate area as directed 1 (One) Time Per Week. Compounded with [facility]        Pharmacy where request should be sent: [facility] [place] -[phone] [facility] [place] - [address] [place] [phone] [place] [phone]      Last office visit with prescribing clinician: 9/18/2023   Last telemedicine visit with prescribing clinician: Visit date not found   Next office visit with prescribing clinician: Visit date not found     Additional details provided by patient: INCREASE DOSAGE    Does the patient have less than a 3 day supply:  [x] Yes  [] No    Would you like a call back once the refill request has been completed: [x] Yes [] No    If the office needs to give you a call back, can they leave a voicemail: [x] Yes [] No    Mayco Jean Rep   02/07/24 13:49 EST           "

## 2024-03-07 NOTE — TELEPHONE ENCOUNTER
"    Caller: Talon Manning \"PJ\"    Relationship: Self    Best call back number: [phone]    Requested Prescriptions:   Requested Prescriptions     Pending Prescriptions Disp Refills    Semaglutide-Weight Management 1 MG/0.5ML solution auto-injector 2 mL 3     Sig: Inject 0.5 mL under the skin into the appropriate area as directed 1 (One) Time Per Week.        Pharmacy where request should be sent: [facility] [place] -[phone] [place] [address] [place] - [address] [facility] [phone] [place] [phone]      Last office visit with prescribing clinician: 9/18/2023   Last telemedicine visit with prescribing clinician: Visit date not found   Next office visit with prescribing clinician: Visit date not found       Does the patient have less than a 3 day supply:  [] Yes  [x] No    Would you like a call back once the refill request has been completed: [] Yes [x] No    If the office needs to give you a call back, can they leave a voicemail: [] Yes [x] No    Mayco Landaverde Rep   03/07/24 10:30 EST             "

## 2024-03-29 ENCOUNTER — TELEPHONE (OUTPATIENT)
Dept: FAMILY MEDICINE CLINIC | Facility: CLINIC | Age: 44
End: 2024-03-29
Payer: COMMERCIAL

## 2024-03-29 DIAGNOSIS — Z13.6 ENCOUNTER FOR SCREENING FOR VASCULAR DISEASE: Primary | ICD-10-CM

## 2024-03-29 NOTE — TELEPHONE ENCOUNTER
"Caller: Talon Manning \"PJ\"    Relationship: Self    Best call back number: [phone]    What orders are you requesting (i.e. lab or imaging):HEART/VASCULAR SCREENING    Additional notes: FAMILY HISTORY OF STROKE, ANEURISM, HEART ATTACK , ABDOMINAL ANEURISMS    "

## 2024-04-05 ENCOUNTER — TELEPHONE (OUTPATIENT)
Dept: FAMILY MEDICINE CLINIC | Facility: CLINIC | Age: 44
End: 2024-04-05
Payer: COMMERCIAL

## 2024-04-05 NOTE — TELEPHONE ENCOUNTER
"Caller: Talon Manning DWIGHT \"PJ\"    Relationship: Self    Best call back number:    [phone] (Home)       Requested Prescriptions:     Semaglutide-Weight Management 1 MG/0.5ML solution auto-injector          Pharmacy where request should be sent:  [facility] \"Compounds Only\" - [place], [place] - [address] [facility] - [phone] [facility] [phone]      Last office visit with prescribing clinician: 9/18/2023   Last telemedicine visit with prescribing clinician: Visit date not found   Next office visit with prescribing clinician: Visit date not found     Additional details provided by patient: PATIENT CALLED TO REQUEST A MEDICATION REFILL ON HIS MEDICATION WITH INCREASE IN DOSAGE. PATIENT STATES THAT HE IS COMPLETELY OUT MEDICATION.        Does the patient have less than a 3 day supply:  [x] Yes  [] No    Would you like a call back once the refill request has been completed: [] Yes [] No    If the office needs to give you a call back, can they leave a voicemail: [] Yes [] No    Mayco Mireles Rep   04/05/24 12:40 EDT         THANKS  "

## 2024-04-08 RX ORDER — SEMAGLUTIDE 1.7 MG/.75ML
1.7 INJECTION, SOLUTION SUBCUTANEOUS WEEKLY
Qty: 2 ML | Refills: 0 | Status: SHIPPED | OUTPATIENT
Start: 2024-04-08

## 2024-05-13 RX ORDER — SIMVASTATIN 40 MG
TABLET ORAL
Qty: 90 TABLET | Refills: 1 | Status: SHIPPED | OUTPATIENT
Start: 2024-05-13 | End: 2024-05-17 | Stop reason: SDUPTHER

## 2024-05-17 RX ORDER — SIMVASTATIN 40 MG
40 TABLET ORAL DAILY
Qty: 90 TABLET | Refills: 1 | Status: SHIPPED | OUTPATIENT
Start: 2024-05-17

## 2024-05-17 NOTE — TELEPHONE ENCOUNTER
"    Caller: Nigel Talon J \"PJ\"    Relationship: Self    Best call back number: [phone]     Requested Prescriptions:   Requested Prescriptions     Pending Prescriptions Disp Refills    simvastatin (ZOCOR) 40 MG tablet 90 tablet 1     Sig: Take 1 tablet by mouth Daily.        Pharmacy where request should be sent: [facility] [address] [phone] [facility] [phone]      Last office visit with prescribing clinician: 9/18/2023   Last telemedicine visit with prescribing clinician: Visit date not found   Next office visit with prescribing clinician: Visit date not found     Additional details provided by patient:     Does the patient have less than a 3 day supply:  [] Yes  [] No    Would you like a call back once the refill request has been completed: [] Yes [] No    If the office needs to give you a call back, can they leave a voicemail: [] Yes [] No    April Mayco Cheng Rep   05/17/24 11:56 EDT         "

## 2024-06-10 ENCOUNTER — TELEPHONE (OUTPATIENT)
Dept: FAMILY MEDICINE CLINIC | Facility: CLINIC | Age: 44
End: 2024-06-10
Payer: COMMERCIAL

## 2024-06-10 DIAGNOSIS — I10 PRIMARY HYPERTENSION: ICD-10-CM

## 2024-06-10 RX ORDER — ALLOPURINOL 300 MG/1
TABLET ORAL
Qty: 90 TABLET | Refills: 3 | Status: SHIPPED | OUTPATIENT
Start: 2024-06-10

## 2024-06-10 RX ORDER — SEMAGLUTIDE 1.7 MG/.75ML
1.7 INJECTION, SOLUTION SUBCUTANEOUS WEEKLY
Qty: 2 ML | Refills: 0 | Status: SHIPPED | OUTPATIENT
Start: 2024-06-10

## 2024-06-10 NOTE — TELEPHONE ENCOUNTER
"Caller: Talon Manning \"PJ\"    Relationship: Self    Best call back number: [phone]     Requested Prescriptions:   COMPOUND INJECTABLE (DIDN'T KNOW NAME)    Pharmacy where request should be sent: [facility] \"COMPOUNDS ONLY\" - [place], [place] - [address] [facility] - [phone] [facility] [phone]      Last office visit with prescribing clinician: 9/18/2023   Last telemedicine visit with prescribing clinician: Visit date not found   Next office visit with prescribing clinician: Visit date not found     Additional details provided by patient: NEXT DOSE DUE IN 7 DAYS    Does the patient have less than a 3 day supply:  [] Yes  [x] No    Mayco Marmolejo Rep   06/10/24 13:33 EDT   "

## 2024-06-18 ENCOUNTER — HOSPITAL ENCOUNTER (OUTPATIENT)
Dept: CARDIOLOGY | Facility: HOSPITAL | Age: 44
Discharge: HOME | End: 2024-06-18

## 2024-06-18 ENCOUNTER — HOSPITAL ENCOUNTER (OUTPATIENT)
Dept: CT IMAGING | Facility: HOSPITAL | Age: 44
Discharge: HOME | End: 2024-06-18

## 2024-06-18 DIAGNOSIS — Z13.6 ENCOUNTER FOR SCREENING FOR VASCULAR DISEASE: ICD-10-CM

## 2024-06-18 PROCEDURE — 75571 CT HRT W/O DYE W/CA TEST: CPT

## 2024-06-18 PROCEDURE — 93799 UNLISTED CV SVC/PROCEDURE: CPT

## 2024-06-20 LAB
BH CV VAS SCREENING CAROTID CCA LEFT: 99 CM/SEC
BH CV VAS SCREENING CAROTID CCA RIGHT: 77 CM/SEC
BH CV VAS SCREENING CAROTID ICA LEFT: 65 CM/SEC
BH CV VAS SCREENING CAROTID ICA RIGHT: 78 CM/SEC
BH CV XLRA MEAS - MID AO DIAM: 2.1 CM
BH CV XLRA MEAS - PAD LEFT ABI PT: 1.23
BH CV XLRA MEAS - PAD LEFT ARM: 136 MMHG
BH CV XLRA MEAS - PAD LEFT LEG PT: 167 MMHG
BH CV XLRA MEAS - PAD RIGHT ABI PT: 1.25
BH CV XLRA MEAS - PAD RIGHT ARM: 135 MMHG
BH CV XLRA MEAS - PAD RIGHT LEG PT: 170 MMHG
BH CV XLRA MEAS LEFT DIST CCA EDV: -27.3 CM/SEC
BH CV XLRA MEAS LEFT DIST CCA PSV: -99.4 CM/SEC
BH CV XLRA MEAS LEFT ICA/CCA RATIO: 0.7
BH CV XLRA MEAS LEFT PROX ICA EDV: -25.5 CM/SEC
BH CV XLRA MEAS LEFT PROX ICA PSV: -65.2 CM/SEC
BH CV XLRA MEAS RIGHT DIST CCA EDV: -20.5 CM/SEC
BH CV XLRA MEAS RIGHT DIST CCA PSV: -77 CM/SEC
BH CV XLRA MEAS RIGHT ICA/CCA RATIO: 1
BH CV XLRA MEAS RIGHT PROX ICA EDV: -30.4 CM/SEC
BH CV XLRA MEAS RIGHT PROX ICA PSV: -77.7 CM/SEC

## 2024-06-24 ENCOUNTER — TELEPHONE (OUTPATIENT)
Dept: FAMILY MEDICINE CLINIC | Facility: CLINIC | Age: 44
End: 2024-06-24

## 2024-06-24 NOTE — TELEPHONE ENCOUNTER
Caller: BETTIE BISHOP    Relationship: Emergency Contact    Best call back number: [phone]     Caller requesting test results: WIFE    What test was performed: CARDIAC CALCIUM SCREENING    When was the test performed: 06/18/24    Where was the test performed: [facility]     Additional notes: WIFE ON  [facility] HAS SOME QUESTIONS REGARDING THE RESULTS OF PATIENT'S TEST. PLEASE CALL TO DISCUSS AND ADVISE.

## 2024-07-05 ENCOUNTER — TELEPHONE (OUTPATIENT)
Dept: FAMILY MEDICINE CLINIC | Facility: CLINIC | Age: 44
End: 2024-07-05
Payer: COMMERCIAL

## 2024-07-05 DIAGNOSIS — Z01.84 IMMUNITY STATUS TESTING: Primary | ICD-10-CM

## 2024-07-05 NOTE — TELEPHONE ENCOUNTER
Caller: BETTIE BISHOP    Relationship: Emergency Contact    Best call back number: [phone]    What orders are you requesting (i.e. lab or imaging): LABS    In what timeframe would the patient need to come in: AS SOON AS POSSIBLE     Where will you receive your lab/imaging services:     Additional notes:  WILL NEED TO BE TESTED FOR MMR, WIFE TESTED NON IMMUNE TO THE [facility]

## 2024-07-09 NOTE — TELEPHONE ENCOUNTER
Spoke with patient's wife Tonya and patient has a physical tomorrow with Anais so he can just have the lab work done at that time and she agreed.

## 2024-07-10 ENCOUNTER — OFFICE VISIT (OUTPATIENT)
Dept: FAMILY MEDICINE CLINIC | Facility: CLINIC | Age: 44
End: 2024-07-10
Payer: COMMERCIAL

## 2024-07-10 ENCOUNTER — LAB (OUTPATIENT)
Dept: FAMILY MEDICINE CLINIC | Facility: CLINIC | Age: 44
End: 2024-07-10
Payer: COMMERCIAL

## 2024-07-10 VITALS
SYSTOLIC BLOOD PRESSURE: 140 MMHG | OXYGEN SATURATION: 98 % | HEART RATE: 86 BPM | BODY MASS INDEX: 30.21 KG/M2 | HEIGHT: 75 IN | RESPIRATION RATE: 18 BRPM | DIASTOLIC BLOOD PRESSURE: 82 MMHG | WEIGHT: 243 LBS

## 2024-07-10 DIAGNOSIS — Z01.84 IMMUNITY STATUS TESTING: ICD-10-CM

## 2024-07-10 DIAGNOSIS — Z00.00 PREVENTATIVE HEALTH CARE: Primary | ICD-10-CM

## 2024-07-10 DIAGNOSIS — Z00.00 PREVENTATIVE HEALTH CARE: ICD-10-CM

## 2024-07-10 LAB — HBA1C MFR BLD: 5.6 % (ref 4.8–5.6)

## 2024-07-10 PROCEDURE — 36415 COLL VENOUS BLD VENIPUNCTURE: CPT

## 2024-07-10 PROCEDURE — 86762 RUBELLA ANTIBODY: CPT | Performed by: NURSE PRACTITIONER

## 2024-07-10 PROCEDURE — 80050 GENERAL HEALTH PANEL: CPT | Performed by: NURSE PRACTITIONER

## 2024-07-10 PROCEDURE — 99396 PREV VISIT EST AGE 40-64: CPT | Performed by: NURSE PRACTITIONER

## 2024-07-10 PROCEDURE — 83036 HEMOGLOBIN GLYCOSYLATED A1C: CPT | Performed by: NURSE PRACTITIONER

## 2024-07-10 PROCEDURE — 84550 ASSAY OF BLOOD/URIC ACID: CPT | Performed by: NURSE PRACTITIONER

## 2024-07-10 PROCEDURE — 86765 RUBEOLA ANTIBODY: CPT | Performed by: NURSE PRACTITIONER

## 2024-07-10 PROCEDURE — 86735 MUMPS ANTIBODY: CPT | Performed by: NURSE PRACTITIONER

## 2024-07-10 PROCEDURE — 80061 LIPID PANEL: CPT | Performed by: NURSE PRACTITIONER

## 2024-07-10 NOTE — PROGRESS NOTES
"Chief Complaint  Annual Exam  Subjective        Talon Manning presents to [facility]  History of Present Illness  Pt comes in today for routine physical.  Still on wegovy, but not losing much weight.   Not exercising much and admits to not dieting.        Objective     Vital Signs:   /82   Pulse 86   Resp 18   Ht 190.5 cm (75\")   Wt 110 kg (243 lb)   SpO2 98%   BMI 30.37 kg/m²       BP Readings from Last 3 Encounters:   07/10/24 140/82   09/18/23 131/86   06/26/23 116/76       Wt Readings from Last 3 Encounters:   07/10/24 110 kg (243 lb)   09/18/23 110 kg (243 lb)   06/26/23 107 kg (236 lb)     Physical Exam  Constitutional:       Appearance: He is well-developed.   HENT:      Head: Normocephalic.   Eyes:      Conjunctiva/sclera: Conjunctivae normal.      Pupils: Pupils are equal, round, and reactive to light.   Neck:      Thyroid: No thyromegaly.   Cardiovascular:      Rate and Rhythm: Normal rate and regular rhythm.      Heart sounds: No murmur heard.  Pulmonary:      Effort: Pulmonary effort is normal.      Breath sounds: Normal breath sounds.   Abdominal:      General: Bowel sounds are normal.      Palpations: Abdomen is soft.      Tenderness: There is no abdominal tenderness.   Musculoskeletal:         General: Normal range of motion.      Cervical back: Normal range of motion and neck supple.   Skin:     General: Skin is warm and dry.      Findings: No lesion.   Neurological:      Mental Status: He is alert and oriented to person, place, and time.   Psychiatric:         Behavior: Behavior normal.        Result Review :                 Assessment and Plan    Diagnoses and all orders for this visit:    1. Preventative health care (Primary)  -     CBC & Differential; Future  -     Comprehensive Metabolic Panel; Future  -     Hemoglobin A1c; Future  -     Lipid Panel; Future  -     TSH; Future  -     Uric Acid; Future    Check labs  Discussed diet and exercise " with pt  During this visit for their annual exam, we reviewed their personal history, social history and family history. We went over their medications and all the recommended health maintenance items for their age group. They were given the opportunity to ask questions and discuss other concerns.           Follow Up   No follow-ups on file.  Patient was given instructions and counseling regarding his condition or for health maintenance advice. Please see specific information pulled into the AVS if appropriate.

## 2024-07-11 LAB
ALBUMIN SERPL-MCNC: 4.4 G/DL (ref 3.5–5.2)
ALBUMIN/GLOB SERPL: 1.5 G/DL
ALP SERPL-CCNC: 53 U/L (ref 39–117)
ALT SERPL W P-5'-P-CCNC: 53 U/L (ref 1–41)
ANION GAP SERPL CALCULATED.3IONS-SCNC: 9 MMOL/L (ref 5–15)
AST SERPL-CCNC: 41 U/L (ref 1–40)
BASOPHILS # BLD AUTO: 0.03 10*3/MM3 (ref 0–0.2)
BASOPHILS NFR BLD AUTO: 0.6 % (ref 0–1.5)
BILIRUB SERPL-MCNC: 0.4 MG/DL (ref 0–1.2)
BUN SERPL-MCNC: 16 MG/DL (ref 6–20)
BUN/CREAT SERPL: 15.4 (ref 7–25)
CALCIUM SPEC-SCNC: 9.6 MG/DL (ref 8.6–10.5)
CHLORIDE SERPL-SCNC: 105 MMOL/L (ref 98–107)
CHOLEST SERPL-MCNC: 186 MG/DL (ref 0–200)
CO2 SERPL-SCNC: 28 MMOL/L (ref 22–29)
CREAT SERPL-MCNC: 1.04 MG/DL (ref 0.76–1.27)
DEPRECATED RDW RBC AUTO: 40.9 FL (ref 37–54)
EGFRCR SERPLBLD CKD-EPI 2021: 91.4 ML/MIN/1.73
EOSINOPHIL # BLD AUTO: 0.1 10*3/MM3 (ref 0–0.4)
EOSINOPHIL NFR BLD AUTO: 1.9 % (ref 0.3–6.2)
ERYTHROCYTE [DISTWIDTH] IN BLOOD BY AUTOMATED COUNT: 12.5 % (ref 12.3–15.4)
GLOBULIN UR ELPH-MCNC: 3 GM/DL
GLUCOSE SERPL-MCNC: 92 MG/DL (ref 65–99)
HCT VFR BLD AUTO: 41.3 % (ref 37.5–51)
HDLC SERPL-MCNC: 49 MG/DL (ref 40–60)
HGB BLD-MCNC: 13.8 G/DL (ref 13–17.7)
IMM GRANULOCYTES # BLD AUTO: 0.01 10*3/MM3 (ref 0–0.05)
IMM GRANULOCYTES NFR BLD AUTO: 0.2 % (ref 0–0.5)
LDLC SERPL CALC-MCNC: 111 MG/DL (ref 0–100)
LDLC/HDLC SERPL: 2.2 {RATIO}
LYMPHOCYTES # BLD AUTO: 1.81 10*3/MM3 (ref 0.7–3.1)
LYMPHOCYTES NFR BLD AUTO: 34.9 % (ref 19.6–45.3)
MCH RBC QN AUTO: 30.1 PG (ref 26.6–33)
MCHC RBC AUTO-ENTMCNC: 33.4 G/DL (ref 31.5–35.7)
MCV RBC AUTO: 90 FL (ref 79–97)
MONOCYTES # BLD AUTO: 0.46 10*3/MM3 (ref 0.1–0.9)
MONOCYTES NFR BLD AUTO: 8.9 % (ref 5–12)
NEUTROPHILS NFR BLD AUTO: 2.77 10*3/MM3 (ref 1.7–7)
NEUTROPHILS NFR BLD AUTO: 53.5 % (ref 42.7–76)
NRBC BLD AUTO-RTO: 0 /100 WBC (ref 0–0.2)
PLATELET # BLD AUTO: 292 10*3/MM3 (ref 140–450)
PMV BLD AUTO: 10.3 FL (ref 6–12)
POTASSIUM SERPL-SCNC: 4.4 MMOL/L (ref 3.5–5.2)
PROT SERPL-MCNC: 7.4 G/DL (ref 6–8.5)
RBC # BLD AUTO: 4.59 10*6/MM3 (ref 4.14–5.8)
SODIUM SERPL-SCNC: 142 MMOL/L (ref 136–145)
TRIGL SERPL-MCNC: 145 MG/DL (ref 0–150)
TSH SERPL DL<=0.05 MIU/L-ACNC: 0.84 UIU/ML (ref 0.27–4.2)
URATE SERPL-MCNC: 5.8 MG/DL (ref 3.4–7)
VLDLC SERPL-MCNC: 26 MG/DL (ref 5–40)
WBC NRBC COR # BLD AUTO: 5.18 10*3/MM3 (ref 3.4–10.8)

## 2024-07-12 LAB
MEV IGG SER IA-ACNC: >300 AU/ML
MUV IGG SER IA-ACNC: 77.6 AU/ML
RUBV IGG SERPL IA-ACNC: 1.08 INDEX

## 2024-07-15 NOTE — PROGRESS NOTES
Overall labs looked ok. Liver enzymes were slightly elevated, but rest of labs looked ok. Cont to work on weight loss to try and help lower this. Also try to decrease tylenol and alcohol if necessary.

## 2024-08-26 RX ORDER — SEMAGLUTIDE 1.7 MG/.75ML
1.7 INJECTION, SOLUTION SUBCUTANEOUS WEEKLY
Qty: 2 ML | Refills: 0 | Status: SHIPPED | OUTPATIENT
Start: 2024-08-26

## 2024-08-26 NOTE — TELEPHONE ENCOUNTER
"    Caller: Talon Manning DWIGHT \"PJ\"    Relationship: Self    Best call back number: [phone]    Requested Prescriptions:   Requested Prescriptions     Pending Prescriptions Disp Refills    Semaglutide-Weight Management (Wegovy) 1.7 MG/0.75ML solution auto-injector 2 mL 0     Sig: Inject 0.75 mL under the skin into the appropriate area as directed 1 (One) Time Per Week. Compound with [facility]        Pharmacy where request should be sent: [facility] \"COMPOUNDS ONLY\" - [address] [phone] [place] [phone]      Last office visit with prescribing clinician: 7/10/2024   Last telemedicine visit with prescribing clinician: Visit date not found   Next office visit with prescribing clinician: 7/14/2025     Does the patient have less than a 3 day supply:  [] Yes  [x] No      Mayco Arceo Rep   08/26/24 12:03 EDT         "

## 2024-10-28 RX ORDER — TADALAFIL 20 MG/1
20 TABLET ORAL DAILY PRN
Qty: 30 TABLET | Refills: 1 | Status: SHIPPED | OUTPATIENT
Start: 2024-10-28

## 2024-10-28 NOTE — TELEPHONE ENCOUNTER
"Caller: Talon Manning DWIGHT \"PJ\"    Relationship: Self    Best call back number:     [phone]       Requested Prescriptions:   Requested Prescriptions     Pending Prescriptions Disp Refills    tadalafil (CIALIS) 20 MG tablet 30 tablet 1     Sig: Take 1 tablet by mouth Daily As Needed for Erectile Dysfunction.        Pharmacy where request should be sent: [facility] [address] [phone] [facility] [phone]      Last office visit with prescribing clinician: 7/10/2024   Last telemedicine visit with prescribing clinician: Visit date not found   Next office visit with prescribing clinician: 7/14/2025     Additional details provided by patient:     Does the patient have less than a 3 day supply:  [] Yes  [x] No    Would you like a call back once the refill request has been completed: [] Yes [] No    If the office needs to give you a call back, can they leave a voicemail: [] Yes [] No    Mayco Jean Rep   10/28/24 13:10 EDT         "

## 2024-10-29 RX ORDER — ESOMEPRAZOLE MAGNESIUM 40 MG/1
CAPSULE, DELAYED RELEASE ORAL
Qty: 90 CAPSULE | Refills: 3 | Status: SHIPPED | OUTPATIENT
Start: 2024-10-29

## 2024-10-29 RX ORDER — SIMVASTATIN 40 MG
40 TABLET ORAL DAILY
Qty: 90 TABLET | Refills: 3 | Status: SHIPPED | OUTPATIENT
Start: 2024-10-29

## 2025-01-03 NOTE — ASSESSMENT & PLAN NOTE
PRE-OPERATIVE NOTE     Chief Complaint had concerns including Office Visit (Pt reports that he received call 01/03 from his dialysis center and was alerted that his pottassium is elevated and high. Pt reports fatigue, weakness, difficulty breathing, and standing for long periods. ) and Pre-Op Exam.  Surgery Date: 01/20/2025  Planned Surgery: ROBOTIC LEFT INGUINAL HERNIA REPAIR WITH MESH, POSSIBLE OPEN, POSSIBLE BILATERAL.  LAPAROSCOPIC PLACEMENT OF PERITONEAL DIALYSIS CATHETER, POSSIBLE OPEN; ROBOTIC LEFT INGUINAL HERNIA REPAIR WITH MESH, POSSIBLE OPEN, POSSIBLE BILATERAL. LAPAROSCOPIC PLACEMENT OF PERITONEAL DIALYSIS CATHETER, POSSIBLE OPEN  Type of Anesthesia: General  Pre-op Diagnosis: Left inguinal hernia [K40.90]; ESRD (end stage renal disease)  (CMD) [N18.6]  Requesting Surgeon: Tabitha Queen MD    Subjective   BRIEF HISTORY LEADING to SURGERY: Santosh Garcia is a 70 year old male here for pre-operative anesthesia consult for surgery as requested by the surgeon.     PMH: hypertensive HD with CAD, systolic HF with EF 15%, nonischemic cardiomyopathy s/p ICD, paroxysmal atrial fibrillation, mitral regurgitation, tricuspid regurgitation, pulmonary HTN, gout, ESRD on HD     The patient presents for a preoperative evaluation for peritoneal dialysis. He is accompanied by his caregiver.    He has been experiencing persistent hyperkalemia for over a month, leading to generalized weakness and mobility issues. He reports difficulty walking short distances and decreased appetite. His potassium levels are monitored very closely, and he has been informed of their consistent elevation over the past month. He is scheduled for dialysis tomorrow and is currently on Lokelma. He rates his overall health as poor. He has a history of emergency room visits due to severe weakness and hyperkalemia. He has a fistula in his right groin and reports no ankle swelling.    He experiences shortness of breath and fears lying down due to  Recommend he get Tdap, typhoid and hepatitis B series.  Once again if he wants to get meningitis series he can do that but this is not absolutely necessary for his travel.  We will look up the CDC recommendations for malaria and call that into his pharmacy as well.   exacerbation of his atrial fibrillation. He has not previously been on oxygen therapy but reports occasional breathing difficulties. His last consultation with Dr. Castellanos was approximately 3 months ago, and he does not have a scheduled appointment with him, hoping for help facilitating follow up. He was last seen by Ashley, a nurse practitioner, in June, who prescribed metoprolol which he is taking    He is scheduled for hernia repair and catheter placement.     Supplemental Information  He is not taking Entresto and Renvela. He is supposed to take Renvela with every meal, but he is not doing it. He is taking midodrine once a day, Lokelma every day, aspirin every day, and allopurinol 100 mg once a day    Review of Systems   Constitutional:  Negative for fever and unexpected weight change.   Respiratory:  Positive for shortness of breath.    Cardiovascular:  Negative for chest pain and leg swelling.   Gastrointestinal:  Negative for abdominal pain.   Neurological:  Positive for weakness.         Objective   PHYSICAL EXAM  Vitals:    01/03/25 1043   BP: 100/64   Pulse: 64   Resp: 17   Temp: 97.2 °F (36.2 °C)   TempSrc: Tympanic   SpO2: 99%   Weight: 68 kg (149 lb 14.6 oz)   Height: 5' 7\" (1.702 m)   PainSc: 7    PainLoc: Leg   Pain Education: Yes   BMI (Calculated): 23.48     Physical Exam  Vitals and nursing note reviewed.   Constitutional:       Appearance: Normal appearance. He is not toxic-appearing or diaphoretic.   HENT:      Head: Normocephalic and atraumatic.      Neck: Normal range of motion. No tenderness.   Eyes:      Extraocular Movements: Extraocular movements intact.      Conjunctiva/sclera: Conjunctivae normal.   Cardiovascular:      Rate and Rhythm: Normal rate and regular rhythm.      Heart sounds: No murmur heard.  Pulmonary:      Effort: Pulmonary effort is normal. No respiratory distress.      Breath sounds: No wheezing, rhonchi or rales.   Abdominal:      General: There is no distension.    Musculoskeletal:         General: Normal range of motion.      Right lower leg: No edema.      Left lower leg: No edema.   Lymphadenopathy:      Cervical: No cervical adenopathy.   Skin:     General: Skin is warm.   Neurological:      General: No focal deficit present.      Mental Status: He is alert.   Psychiatric:         Mood and Affect: Mood normal.         Behavior: Behavior normal.         Thought Content: Thought content normal.         Judgment: Judgment normal.               RESULTS REVIEW         Most Recent  Na+  135 (5/26/2024)  K+  5.6 (6/18/2024)   Glucose  80 (5/26/2024)  GFR  5 (5/26/2024)   The last Potassium was abnormal at 5.6 (6/18/2024).The last eGFR was abnormal at 5 (5/26/2024).The last Total Bilirubin was abnormal at 1.2 (5/26/2024) (Normal is 0.2 - 1.0 mg/dL).    Most Recent  WBC  7.1 (5/26/2024)  HGB  11 (5/26/2024)     Platelet  124 (5/26/2024)       The last Hemoglobin was abnormal at 11 (5/26/2024) (Normal 13 - 17 g/dL).The last Platelet count was abnormal at 124 (5/26/2024) (Normal 140-450 K/mcL).      Medications, allergies, past medical, surgical, social and family histories were reviewed and updated as appropriate, see encounter summary. Pertinent surgical risks and history are below.    SURGICAL RISK AND SCREENINGS     Family History Risks  Adverse Reaction to Anesthesia: No  Bleeding or Blood Disorder: No  Malignant Hyperthermia: No    Personal Surgical History  Anesthetic Complications: No  Bleeding Problems: No  Problems with Surgery: No  Sleep Apnea is on Problem List    Malnutrition Screening Tool Current Weight 150 lbs  Have you recently lost weight without trying? No  Have you been eating poorly because of a decreased appetite? Yes  Score = 1, Not at Risk       Functional Capacity  Are you able to do light housework, dusting, wash dishes, climb a flight of stairs or walk up a hill without chest pain or problems breathing (>4 METS)? No    Revised Cardiac Risk  Index  Intermediate or Higher Risk Surgery No   History of Ischemic Heart Disease or Cardiac Chest Pain No;    Congestive Heart Failure Yes; Ejection Fraction 15 (11/02/2023)   History of Stroke or TIA No   Last Creatinine >2 Yes; 10.06 g/dL (05/26/2024)   Prescribed Insulin No   Estimated Risk of a Major Cardiac Complication 2 risk factors = 10.1%       Acute Kidney Injury Prevention  Patient has a diagnosis of Complication Of Av Dialysis Fistula on their problem list. Consider perioperative management of dialysis and if needed consult to specialist.    Advance care planning documents on file - no     ASSESSMENT AND PLAN        1. Pre-op evaluation  2. Malfunction of arteriovenous graft, initial encounter (CMD)  Assessment & Plan:  Going for PD placement 1/20  3. Hyperkalemia  Assessment & Plan:  His elevated potassium levels are likely contributing to his overall poor health status  Spoke to pt's nephrologist today re: K > 7 this week 12/30, she reports pt was dialyzed after that was drawn and no indication for emergent eval/tx because will check labs tomorrow and adjust HD and lokelma as indicated  EKG stable today  Strict ER precautions given if pt develops new sx  4. ESRD on hemodialysis  (CMD)  Assessment & Plan:  Follows with nephrology, Dr. Marks, HD T/TH/Sat, AVF in R groin, next visit and HD session tomorrow 1/4  Sevelamer 800mg 2tab TID but not taking as prescribed  Lokelma every day  Going for PD placement 1/20  5. Paroxysmal atrial fibrillation  (CMD)  Assessment & Plan:  Per chart review Dx in 2013, as possible result of pericarditis  Not currently taking eliquis, advised to discuss with cardiology at pre op visit  Continue metoprolol XL 25mg daily and ASA 81mg daily  6. Hypertensive heart disease with chronic combined systolic and diastolic congestive heart failure  (CMD)  Assessment & Plan:  BP at goal today  ICD in place and following with cardiology, LV 6/2024  Echo 11/2023 with EF 15%  Taking  Midodrine, prescribed TID but only taking once daily  Previously on spironolactone however resulting in hyperkalemia  Not taking entresto or eliquis 5mg as previously prescribed  Taking metoprolol XL 25mg daily and ASA 81mg daily  Pt to see cardiology for pre op optimization  7. Chronic gout of multiple sites, unspecified cause  Assessment & Plan:  Continue Allopurinol 100mg daily        Preop Optimization  - OPTIMIZED for SURGERY: Needs cardiology pre op optimization.  - Workup reviewed and/or ordered: See Orders  - Pre-Op management of pacemaker, dialysis or steroids: Preoperative hemodialysis per nephrology recommendations.  - Post-Op DVT prophylaxis: per surgical team  - Smoking Status: Former Smoker (Quit 01/01/2009).     Pertinent Conditions    #Hypertensive Heart Disease With Chronic Combined Systolic And Diastolic Congestive Heart Failure (Cmd) today's blood pressure was /64    #Hemodialysis Access, Av Graft (Cmd) the last eGFR was 5 (5/26/2024)    #Anemia the last Hemoglobin was 11 (5/26/2024) in comparison the second to last result was 12.1 (2/22/2024).    #Low Hemoglobin the last Hemoglobin was 11 (5/26/2024) in comparison the second to last result was 12.1 (2/22/2024).    #Paroxysmal Atrial Fibrillation (Cmd) current heart rate is 64.    #Sleep Apnea    #Heart Failure Ejection Fraction 15 (11/02/2023)      Before Surgery Hold (Do Not Take) these Medications  Medications and Supplements:  - Morning of surgery hold any Vitamins AND for 2 weeks prior hold any Vitamin E or Herbals     Anticoagulation:   - Coordinate holding pre op with cardiology (aspirin 81 MG chewable tablet [[id number]])  - NOT currently taking (apixaBAN (Eliquis) 5 MG Tab [[id number]])    Antidiabetic:none on med list    DIRK Risk (Diuretics, ACE-I/ARB, NSAIDs):none on med list      Continue all other medications unless an alternative plan was advised with the surgeon and/or specialist.          Electronically signed by: Abbey  MD Octavio  Copy sent to: Tabitha Queen MD

## 2025-02-03 RX ORDER — TADALAFIL 20 MG/1
20 TABLET ORAL DAILY PRN
Qty: 30 TABLET | Refills: 1 | Status: SHIPPED | OUTPATIENT
Start: 2025-02-03

## 2025-02-03 NOTE — TELEPHONE ENCOUNTER
"    Caller: Talon Manning DWIGHT \"PJ\"    Relationship: Self    Best call back number: [phone]     Requested Prescriptions:   Requested Prescriptions     Pending Prescriptions Disp Refills    tadalafil (CIALIS) 20 MG tablet 30 tablet 1     Sig: Take 1 tablet by mouth Daily As Needed for Erectile Dysfunction.        Pharmacy where request should be sent: [facility] [address] [phone] [facility] [phone]      Last office visit with prescribing clinician: 7/10/2024   Last telemedicine visit with prescribing clinician: Visit date not found   Next office visit with prescribing clinician: 7/14/2025     Additional details provided by patient: PATIENT STATED THAT PHARMACY IS REQUESTING  prior authorization.    Does the patient have less than a 3 day supply:  [x] Yes  [] No    Would you like a call back once the refill request has been completed: [x] Yes [] No    If the office needs to give you a call back, can they leave a voicemail: [x] Yes [] No    Mayco Torrez Rep   02/03/25 12:15 EST     "

## 2025-02-15 DIAGNOSIS — I10 PRIMARY HYPERTENSION: ICD-10-CM

## 2025-02-17 RX ORDER — AMLODIPINE BESYLATE 2.5 MG/1
TABLET ORAL
Qty: 90 TABLET | Refills: 3 | Status: SHIPPED | OUTPATIENT
Start: 2025-02-17

## 2025-03-12 DIAGNOSIS — I10 PRIMARY HYPERTENSION: ICD-10-CM

## 2025-03-12 RX ORDER — LISINOPRIL AND HYDROCHLOROTHIAZIDE 10; 12.5 MG/1; MG/1
1 TABLET ORAL DAILY
Qty: 90 TABLET | Refills: 3 | Status: SHIPPED | OUTPATIENT
Start: 2025-03-12

## 2025-06-06 RX ORDER — ALLOPURINOL 300 MG/1
300 TABLET ORAL DAILY
Qty: 90 TABLET | Refills: 0 | Status: SHIPPED | OUTPATIENT
Start: 2025-06-06

## 2025-07-14 ENCOUNTER — OFFICE VISIT (OUTPATIENT)
Dept: FAMILY MEDICINE CLINIC | Facility: CLINIC | Age: 45
End: 2025-07-14
Payer: COMMERCIAL

## 2025-07-14 VITALS
DIASTOLIC BLOOD PRESSURE: 74 MMHG | RESPIRATION RATE: 18 BRPM | BODY MASS INDEX: 32.15 KG/M2 | HEIGHT: 75 IN | HEART RATE: 74 BPM | WEIGHT: 258.6 LBS | SYSTOLIC BLOOD PRESSURE: 132 MMHG | OXYGEN SATURATION: 98 %

## 2025-07-14 DIAGNOSIS — Z00.00 PREVENTATIVE HEALTH CARE: Primary | ICD-10-CM

## 2025-07-14 DIAGNOSIS — D50.9 IRON DEFICIENCY ANEMIA, UNSPECIFIED IRON DEFICIENCY ANEMIA TYPE: ICD-10-CM

## 2025-07-14 DIAGNOSIS — D75.9 THICK BLOOD: ICD-10-CM

## 2025-07-14 DIAGNOSIS — R53.82 CHRONIC FATIGUE: ICD-10-CM

## 2025-07-14 DIAGNOSIS — Z12.5 SCREENING FOR PROSTATE CANCER: ICD-10-CM

## 2025-07-14 PROCEDURE — 99396 PREV VISIT EST AGE 40-64: CPT | Performed by: NURSE PRACTITIONER

## 2025-07-14 PROCEDURE — 99214 OFFICE O/P EST MOD 30 MIN: CPT | Performed by: NURSE PRACTITIONER

## 2025-07-14 NOTE — PROGRESS NOTES
"Chief Complaint  Annual Exam  Subjective        Talon Manning presents to [facility]  History of Present Illness  Pt comes in today for routine physical.  Requesting specific labs be done today.   Wife has concerns about strong family hx of CAD  Also mentions that he seems to have \"thick blood\". Noticed this when he cut himself.   He has issues with gout, chronic fatigue and hx of anemia.            Objective     Vital Signs:   /74   Pulse 74   Resp 18   Ht 190.5 cm (75\")   Wt 117 kg (258 lb 9.6 oz)   SpO2 98%   BMI 32.32 kg/m²       BP Readings from Last 3 Encounters:   07/14/25 132/74   07/10/24 140/82   09/18/23 131/86       Wt Readings from Last 3 Encounters:   07/14/25 117 kg (258 lb 9.6 oz)   07/10/24 110 kg (243 lb)   09/18/23 110 kg (243 lb)     Physical Exam  Constitutional:       Appearance: He is well-developed.   HENT:      Head: Normocephalic.   Eyes:      Conjunctiva/sclera: Conjunctivae normal.      Pupils: Pupils are equal, round, and reactive to light.   Neck:      Thyroid: No thyromegaly.   Cardiovascular:      Rate and Rhythm: Normal rate and regular rhythm.      Heart sounds: No murmur heard.  Pulmonary:      Effort: Pulmonary effort is normal.      Breath sounds: Normal breath sounds.   Abdominal:      General: Bowel sounds are normal.      Palpations: Abdomen is soft.      Tenderness: There is no abdominal tenderness.   Musculoskeletal:         General: Normal range of motion.      Cervical back: Normal range of motion and neck supple.   Skin:     General: Skin is warm and dry.      Findings: No lesion.   Neurological:      Mental Status: He is alert and oriented to person, place, and time.   Psychiatric:         Behavior: Behavior normal.        Result Review :                 Assessment and Plan    Diagnoses and all orders for this visit:    1. Preventative health care (Primary)  -     CBC & Differential; Future  -     Comprehensive Metabolic " Panel; Future  -     Hemoglobin A1c; Future  -     Lipid Panel; Future  -     PSA Screen; Future  -     TSH; Future  -     Testosterone (Free & Total), LC / MS; Future  -     Ferritin; Future  -     Iron and TIBC; Future  -     Magnesium; Future  -     Thyroid Peroxidase Antibody  -     Uric acid; Future  -     Protime-INR; Future  -     APTT; Future  -     Vitamin D,25-Hydroxy; Future    2. Screening for prostate cancer  -     PSA Screen; Future    3. Chronic fatigue  -     Vitamin D,25-Hydroxy; Future    4. Iron deficiency anemia, unspecified iron deficiency anemia type    5. Thick blood  -     Protime-INR; Future  -     APTT; Future    Check labs  Colonoscopy at age 46   During this visit for their annual exam, we reviewed their personal history, social history and family history. We went over their medications and all the recommended health maintenance items for their age group. They were given the opportunity to ask questions and discuss other concerns.   Discussed importance of regular exercise and recommended starting or continuing a regular exercise program for good health. The patient was also encouraged to lose weight for better health.           Follow Up   Return in about 6 months (around 1/14/2026) for HTN follow up.  Patient was given instructions and counseling regarding his condition or for health maintenance advice. Please see specific information pulled into the AVS if appropriate.

## 2025-07-17 ENCOUNTER — LAB (OUTPATIENT)
Dept: FAMILY MEDICINE CLINIC | Facility: CLINIC | Age: 45
End: 2025-07-17
Payer: COMMERCIAL

## 2025-07-17 DIAGNOSIS — R53.82 CHRONIC FATIGUE: ICD-10-CM

## 2025-07-17 DIAGNOSIS — D75.9 THICK BLOOD: ICD-10-CM

## 2025-07-17 DIAGNOSIS — Z00.00 PREVENTATIVE HEALTH CARE: ICD-10-CM

## 2025-07-17 DIAGNOSIS — Z12.5 SCREENING FOR PROSTATE CANCER: ICD-10-CM

## 2025-07-17 LAB
APTT PPP: 26.6 SECONDS (ref 22.7–35.4)
INR PPP: 1.04 (ref 0.9–1.1)
PROTHROMBIN TIME: 13.5 SECONDS (ref 11.7–14.2)

## 2025-07-17 PROCEDURE — 84550 ASSAY OF BLOOD/URIC ACID: CPT | Performed by: NURSE PRACTITIONER

## 2025-07-17 PROCEDURE — 84403 ASSAY OF TOTAL TESTOSTERONE: CPT | Performed by: NURSE PRACTITIONER

## 2025-07-17 PROCEDURE — 80061 LIPID PANEL: CPT | Performed by: NURSE PRACTITIONER

## 2025-07-17 PROCEDURE — 85730 THROMBOPLASTIN TIME PARTIAL: CPT | Performed by: NURSE PRACTITIONER

## 2025-07-17 PROCEDURE — G0103 PSA SCREENING: HCPCS | Performed by: NURSE PRACTITIONER

## 2025-07-17 PROCEDURE — 82306 VITAMIN D 25 HYDROXY: CPT | Performed by: NURSE PRACTITIONER

## 2025-07-17 PROCEDURE — 83036 HEMOGLOBIN GLYCOSYLATED A1C: CPT | Performed by: NURSE PRACTITIONER

## 2025-07-17 PROCEDURE — 80050 GENERAL HEALTH PANEL: CPT | Performed by: NURSE PRACTITIONER

## 2025-07-17 PROCEDURE — 36415 COLL VENOUS BLD VENIPUNCTURE: CPT

## 2025-07-17 PROCEDURE — 83735 ASSAY OF MAGNESIUM: CPT | Performed by: NURSE PRACTITIONER

## 2025-07-17 PROCEDURE — 86376 MICROSOMAL ANTIBODY EACH: CPT | Performed by: NURSE PRACTITIONER

## 2025-07-17 PROCEDURE — 84402 ASSAY OF FREE TESTOSTERONE: CPT | Performed by: NURSE PRACTITIONER

## 2025-07-17 PROCEDURE — 83540 ASSAY OF IRON: CPT | Performed by: NURSE PRACTITIONER

## 2025-07-17 PROCEDURE — 85610 PROTHROMBIN TIME: CPT | Performed by: NURSE PRACTITIONER

## 2025-07-17 PROCEDURE — 84466 ASSAY OF TRANSFERRIN: CPT | Performed by: NURSE PRACTITIONER

## 2025-07-17 PROCEDURE — 82728 ASSAY OF FERRITIN: CPT | Performed by: NURSE PRACTITIONER

## 2025-07-18 LAB
25(OH)D3 SERPL-MCNC: 35.4 NG/ML (ref 30–100)
ALBUMIN SERPL-MCNC: 4.2 G/DL (ref 3.5–5.2)
ALBUMIN/GLOB SERPL: 1.1 G/DL
ALP SERPL-CCNC: 63 U/L (ref 39–117)
ALT SERPL W P-5'-P-CCNC: 52 U/L (ref 1–41)
ANION GAP SERPL CALCULATED.3IONS-SCNC: 13.4 MMOL/L (ref 5–15)
AST SERPL-CCNC: 38 U/L (ref 1–40)
BASOPHILS # BLD AUTO: 0.06 10*3/MM3 (ref 0–0.2)
BASOPHILS NFR BLD AUTO: 0.8 % (ref 0–1.5)
BILIRUB SERPL-MCNC: 0.8 MG/DL (ref 0–1.2)
BUN SERPL-MCNC: 12 MG/DL (ref 6–20)
BUN/CREAT SERPL: 10.2 (ref 7–25)
CALCIUM SPEC-SCNC: 9.9 MG/DL (ref 8.6–10.5)
CHLORIDE SERPL-SCNC: 100 MMOL/L (ref 98–107)
CHOLEST SERPL-MCNC: 194 MG/DL (ref 0–200)
CO2 SERPL-SCNC: 25.6 MMOL/L (ref 22–29)
CREAT SERPL-MCNC: 1.18 MG/DL (ref 0.76–1.27)
DEPRECATED RDW RBC AUTO: 42 FL (ref 37–54)
EGFRCR SERPLBLD CKD-EPI 2021: 78 ML/MIN/1.73
EOSINOPHIL # BLD AUTO: 0.12 10*3/MM3 (ref 0–0.4)
EOSINOPHIL NFR BLD AUTO: 1.6 % (ref 0.3–6.2)
ERYTHROCYTE [DISTWIDTH] IN BLOOD BY AUTOMATED COUNT: 12.9 % (ref 12.3–15.4)
FERRITIN SERPL-MCNC: 128 NG/ML (ref 30–400)
GLOBULIN UR ELPH-MCNC: 3.8 GM/DL
GLUCOSE SERPL-MCNC: 104 MG/DL (ref 65–99)
HBA1C MFR BLD: 5.6 % (ref 4.8–5.6)
HCT VFR BLD AUTO: 48.8 % (ref 37.5–51)
HDLC SERPL-MCNC: 41 MG/DL (ref 40–60)
HGB BLD-MCNC: 16.1 G/DL (ref 13–17.7)
IMM GRANULOCYTES # BLD AUTO: 0.02 10*3/MM3 (ref 0–0.05)
IMM GRANULOCYTES NFR BLD AUTO: 0.3 % (ref 0–0.5)
IRON 24H UR-MRATE: 254 MCG/DL (ref 59–158)
IRON SATN MFR SERPL: 54 % (ref 20–50)
LDLC SERPL CALC-MCNC: 108 MG/DL (ref 0–100)
LDLC/HDLC SERPL: 2.46 {RATIO}
LYMPHOCYTES # BLD AUTO: 2.43 10*3/MM3 (ref 0.7–3.1)
LYMPHOCYTES NFR BLD AUTO: 32.7 % (ref 19.6–45.3)
MAGNESIUM SERPL-MCNC: 2.3 MG/DL (ref 1.6–2.6)
MCH RBC QN AUTO: 30 PG (ref 26.6–33)
MCHC RBC AUTO-ENTMCNC: 33 G/DL (ref 31.5–35.7)
MCV RBC AUTO: 90.9 FL (ref 79–97)
MONOCYTES # BLD AUTO: 0.47 10*3/MM3 (ref 0.1–0.9)
MONOCYTES NFR BLD AUTO: 6.3 % (ref 5–12)
NEUTROPHILS NFR BLD AUTO: 4.32 10*3/MM3 (ref 1.7–7)
NEUTROPHILS NFR BLD AUTO: 58.3 % (ref 42.7–76)
NRBC BLD AUTO-RTO: 0 /100 WBC (ref 0–0.2)
PLATELET # BLD AUTO: 359 10*3/MM3 (ref 140–450)
PMV BLD AUTO: 10.1 FL (ref 6–12)
POTASSIUM SERPL-SCNC: 4.1 MMOL/L (ref 3.5–5.2)
PROT SERPL-MCNC: 8 G/DL (ref 6–8.5)
PSA SERPL-MCNC: 0.82 NG/ML (ref 0–4)
RBC # BLD AUTO: 5.37 10*6/MM3 (ref 4.14–5.8)
SODIUM SERPL-SCNC: 139 MMOL/L (ref 136–145)
TIBC SERPL-MCNC: 472 MCG/DL (ref 298–536)
TRANSFERRIN SERPL-MCNC: 317 MG/DL (ref 200–360)
TRIGL SERPL-MCNC: 260 MG/DL (ref 0–150)
TSH SERPL DL<=0.05 MIU/L-ACNC: 1.54 UIU/ML (ref 0.27–4.2)
URATE SERPL-MCNC: 6 MG/DL (ref 3.4–7)
VLDLC SERPL-MCNC: 45 MG/DL (ref 5–40)
WBC NRBC COR # BLD AUTO: 7.42 10*3/MM3 (ref 3.4–10.8)

## 2025-07-19 LAB — THYROPEROXIDASE AB SERPL-ACNC: 15 IU/ML (ref 0–34)

## 2025-07-22 DIAGNOSIS — E83.10 IRON DISORDER: Primary | ICD-10-CM

## 2025-07-25 LAB
TESTOST FREE SERPL-MCNC: 21.1 PG/ML (ref 6.8–21.5)
TESTOST SERPL-MCNC: 637.1 NG/DL (ref 264–916)

## 2025-08-06 ENCOUNTER — CONSULT (OUTPATIENT)
Dept: ONCOLOGY | Facility: CLINIC | Age: 45
End: 2025-08-06
Payer: COMMERCIAL

## 2025-08-06 ENCOUNTER — LAB (OUTPATIENT)
Dept: LAB | Facility: HOSPITAL | Age: 45
End: 2025-08-06
Payer: COMMERCIAL

## 2025-08-06 VITALS
HEART RATE: 66 BPM | WEIGHT: 258 LBS | DIASTOLIC BLOOD PRESSURE: 90 MMHG | SYSTOLIC BLOOD PRESSURE: 130 MMHG | BODY MASS INDEX: 32.08 KG/M2 | HEIGHT: 75 IN | OXYGEN SATURATION: 98 % | TEMPERATURE: 97.9 F

## 2025-08-06 DIAGNOSIS — E83.10 IRON DISORDER: Primary | ICD-10-CM

## 2025-08-06 DIAGNOSIS — E83.10 IRON DISORDER: ICD-10-CM

## 2025-08-06 LAB
BASOPHILS # BLD AUTO: 0.03 10*3/MM3 (ref 0–0.2)
BASOPHILS NFR BLD AUTO: 0.6 % (ref 0–1.5)
CRP SERPL-MCNC: <0.3 MG/DL (ref 0–0.5)
DEPRECATED RDW RBC AUTO: 41.1 FL (ref 37–54)
EOSINOPHIL # BLD AUTO: 0.09 10*3/MM3 (ref 0–0.4)
EOSINOPHIL NFR BLD AUTO: 1.8 % (ref 0.3–6.2)
ERYTHROCYTE [DISTWIDTH] IN BLOOD BY AUTOMATED COUNT: 12.4 % (ref 12.3–15.4)
FERRITIN SERPL-MCNC: 45.6 NG/ML (ref 30–400)
HCT VFR BLD AUTO: 38.6 % (ref 37.5–51)
HGB BLD-MCNC: 13 G/DL (ref 13–17.7)
IMM GRANULOCYTES # BLD AUTO: 0 10*3/MM3 (ref 0–0.05)
IMM GRANULOCYTES NFR BLD AUTO: 0 % (ref 0–0.5)
IRON 24H UR-MRATE: 79 MCG/DL (ref 59–158)
IRON SATN MFR SERPL: 18 % (ref 20–50)
LYMPHOCYTES # BLD AUTO: 1.59 10*3/MM3 (ref 0.7–3.1)
LYMPHOCYTES NFR BLD AUTO: 30.9 % (ref 19.6–45.3)
MCH RBC QN AUTO: 30 PG (ref 26.6–33)
MCHC RBC AUTO-ENTMCNC: 33.7 G/DL (ref 31.5–35.7)
MCV RBC AUTO: 89.1 FL (ref 79–97)
MONOCYTES # BLD AUTO: 0.38 10*3/MM3 (ref 0.1–0.9)
MONOCYTES NFR BLD AUTO: 7.4 % (ref 5–12)
NEUTROPHILS NFR BLD AUTO: 3.05 10*3/MM3 (ref 1.7–7)
NEUTROPHILS NFR BLD AUTO: 59.3 % (ref 42.7–76)
PLATELET # BLD AUTO: 272 10*3/MM3 (ref 140–450)
PMV BLD AUTO: 8.9 FL (ref 6–12)
RBC # BLD AUTO: 4.33 10*6/MM3 (ref 4.14–5.8)
TIBC SERPL-MCNC: 446 MCG/DL (ref 298–536)
TRANSFERRIN SERPL-MCNC: 299 MG/DL (ref 200–360)
WBC NRBC COR # BLD AUTO: 5.14 10*3/MM3 (ref 3.4–10.8)

## 2025-08-06 PROCEDURE — 36415 COLL VENOUS BLD VENIPUNCTURE: CPT

## 2025-08-06 PROCEDURE — 86140 C-REACTIVE PROTEIN: CPT | Performed by: STUDENT IN AN ORGANIZED HEALTH CARE EDUCATION/TRAINING PROGRAM

## 2025-08-06 PROCEDURE — 84466 ASSAY OF TRANSFERRIN: CPT | Performed by: STUDENT IN AN ORGANIZED HEALTH CARE EDUCATION/TRAINING PROGRAM

## 2025-08-06 PROCEDURE — 85025 COMPLETE CBC W/AUTO DIFF WBC: CPT

## 2025-08-06 PROCEDURE — 83540 ASSAY OF IRON: CPT | Performed by: STUDENT IN AN ORGANIZED HEALTH CARE EDUCATION/TRAINING PROGRAM

## 2025-08-06 PROCEDURE — 82728 ASSAY OF FERRITIN: CPT | Performed by: STUDENT IN AN ORGANIZED HEALTH CARE EDUCATION/TRAINING PROGRAM

## 2025-08-07 ENCOUNTER — PATIENT ROUNDING (BHMG ONLY) (OUTPATIENT)
Dept: ONCOLOGY | Facility: CLINIC | Age: 45
End: 2025-08-07
Payer: COMMERCIAL

## (undated) DEVICE — ENDOPATH XCEL BLADELESS TROCARS WITH STABILITY SLEEVES: Brand: ENDOPATH XCEL

## (undated) DEVICE — ENDOPOUCH RETRIEVER SPECIMEN RETRIEVAL BAGS: Brand: ENDOPOUCH RETRIEVER

## (undated) DEVICE — 2, DISPOSABLE SUCTION/IRRIGATOR WITH DISPOSABLE TIP: Brand: STRYKEFLOW

## (undated) DEVICE — TOTAL TRAY, DB, 100% SILI FOLEY, 16FR 10: Brand: MEDLINE

## (undated) DEVICE — SUT VIC 0/0 UR6 27IN DYED J603H

## (undated) DEVICE — BNDG ADHS PLSTC 1X3IN LF

## (undated) DEVICE — ENDOPATH 5 MM GRASPERS WITH RATCHET HANDLES: Brand: ENDOPATH

## (undated) DEVICE — UNDERGLV SURG BIOGEL INDICAT PF 61/2 GRN

## (undated) DEVICE — LAPAROSCOPIC GAS CONDITIONING DEVICE.: Brand: INSUFLOW

## (undated) DEVICE — HARMONIC ACE +7 LAPAROSCOPIC SHEARS ADVANCED HEMOSTASIS 5MM DIAMETER 36CM SHAFT LENGTH  FOR USE WITH GRAY HAND PIECE ONLY: Brand: HARMONIC ACE

## (undated) DEVICE — PK PROC TURNOVER

## (undated) DEVICE — ENDOPATH XCEL BLUNT TIP TROCARS WITH SMOOTH SLEEVES: Brand: ENDOPATH XCEL

## (undated) DEVICE — GLV SURG BIOGEL SENSR LTX PF SZ6.5

## (undated) DEVICE — SOL IRRIG H2O 1000ML STRL

## (undated) DEVICE — THE ECHELON FLEX POWERED PLUS ARTICULATING ENDOSCOPIC LINEAR CUTTERS ARE STERILE, SINGLE PATIENT USE INSTRUMENTS THAT SIMULTANEOUSLYCUT AND STAPLE TISSUE. THERE ARE SIX STAGGERED ROWS OF STAPLES, THREE ON EITHER SIDE OF THE CUT LINE. THE ECHELON FLEX 45 POWERED PLUSINSTRUMENTS HAVE A STAPLE LINE THAT IS APPROXIMATELY 45 MM LONG AND A CUT LINE THAT IS APPROXIMATELY 42 MM LONG. THE SHAFT CAN ROTATE FREELYIN BOTH DIRECTIONS AND AN ARTICULATION MECHANISM ENABLES THE DISTAL PORTION OF THE SHAFT TO PIVOT TO FACILITATE LATERAL ACCESS TO THE OPERATIVESITE.THE INSTRUMENTS ARE PACKAGED WITH A PRIMARY LITHIUM BATTERY PACK THAT MUST BE INSTALLED PRIOR TO USE. THERE ARE SPECIFIC REQUIREMENTS FORDISPOSING OF THE BATTERY PACK. REFER TO THE BATTERY PACK DISPOSAL SECTION.THE INSTRUMENTS ARE PACKAGED WITHOUT A RELOAD AND MUST BE LOADED PRIOR TO USE. A STAPLE RETAINING CAP ON THE RELOAD PROTECTS THE STAPLE LEGPOINTS DURING SHIPPING AND TRANSPORTATION. THE INSTRUMENTS’ LOCK-OUT FEATURE IS DESIGNED TO PREVENT A USED OR IMPROPERLY INSTALLED RELOADFROM BEING REFIRED OR AN INSTRUMENT FROM BEING FIRED WITHOUT A RELOAD.: Brand: ECHELON FLEX

## (undated) DEVICE — UNDYED BRAIDED (POLYGLACTIN 910), SYNTHETIC ABSORBABLE SUTURE: Brand: COATED VICRYL

## (undated) DEVICE — ENDOPATH XCEL WITH OPTIVIEW TECHNOLOGY BLADELESS TROCARS WITH STABILITY SLEEVES: Brand: ENDOPATH XCEL OPTIVIEW

## (undated) DEVICE — HARMONIC ACE 5MM DIAMETER SHEARS 36CM SHAFT LENGTH + ADAPTIVE TISSUE TECHNOLOGY FOR USE WITH GENERATOR G11: Brand: HARMONIC ACE

## (undated) DEVICE — SOL NACL 0.9PCT 1000ML

## (undated) DEVICE — GENERAL LAPAROSCOPY CDS: Brand: MEDLINE INDUSTRIES, INC.